# Patient Record
Sex: FEMALE | Race: WHITE | Employment: UNEMPLOYED | URBAN - METROPOLITAN AREA
[De-identification: names, ages, dates, MRNs, and addresses within clinical notes are randomized per-mention and may not be internally consistent; named-entity substitution may affect disease eponyms.]

---

## 2017-03-27 ENCOUNTER — OFFICE VISIT (OUTPATIENT)
Dept: INTERNAL MEDICINE CLINIC | Age: 82
End: 2017-03-27

## 2017-03-27 VITALS
DIASTOLIC BLOOD PRESSURE: 78 MMHG | HEART RATE: 88 BPM | HEIGHT: 63 IN | BODY MASS INDEX: 30.65 KG/M2 | SYSTOLIC BLOOD PRESSURE: 118 MMHG | WEIGHT: 173 LBS

## 2017-03-27 DIAGNOSIS — G25.81 RESTLESS LEGS SYNDROME: ICD-10-CM

## 2017-03-27 DIAGNOSIS — J84.112 UIP (USUAL INTERSTITIAL PNEUMONITIS) (HCC): ICD-10-CM

## 2017-03-27 DIAGNOSIS — K21.9 GASTROESOPHAGEAL REFLUX DISEASE WITHOUT ESOPHAGITIS: Primary | ICD-10-CM

## 2017-03-27 DIAGNOSIS — J96.11 CHRONIC RESPIRATORY FAILURE WITH HYPOXIA (HCC): ICD-10-CM

## 2017-03-27 PROCEDURE — G8399 PT W/DXA RESULTS DOCUMENT: HCPCS | Performed by: FAMILY MEDICINE

## 2017-03-27 PROCEDURE — G8417 CALC BMI ABV UP PARAM F/U: HCPCS | Performed by: FAMILY MEDICINE

## 2017-03-27 PROCEDURE — G8484 FLU IMMUNIZE NO ADMIN: HCPCS | Performed by: FAMILY MEDICINE

## 2017-03-27 PROCEDURE — 99213 OFFICE O/P EST LOW 20 MIN: CPT | Performed by: FAMILY MEDICINE

## 2017-03-27 PROCEDURE — 1123F ACP DISCUSS/DSCN MKR DOCD: CPT | Performed by: FAMILY MEDICINE

## 2017-03-27 PROCEDURE — 4040F PNEUMOC VAC/ADMIN/RCVD: CPT | Performed by: FAMILY MEDICINE

## 2017-03-27 PROCEDURE — 1090F PRES/ABSN URINE INCON ASSESS: CPT | Performed by: FAMILY MEDICINE

## 2017-03-27 PROCEDURE — 1036F TOBACCO NON-USER: CPT | Performed by: FAMILY MEDICINE

## 2017-03-27 PROCEDURE — G8427 DOCREV CUR MEDS BY ELIG CLIN: HCPCS | Performed by: FAMILY MEDICINE

## 2017-03-27 ASSESSMENT — ENCOUNTER SYMPTOMS
CONSTIPATION: 0
CHEST TIGHTNESS: 0
WHEEZING: 0
NAUSEA: 0
VOMITING: 0
SHORTNESS OF BREATH: 0
COUGH: 1
DIARRHEA: 0

## 2017-08-12 DIAGNOSIS — K21.9 GASTROESOPHAGEAL REFLUX DISEASE WITHOUT ESOPHAGITIS: ICD-10-CM

## 2017-08-14 RX ORDER — OMEPRAZOLE 20 MG/1
CAPSULE, DELAYED RELEASE ORAL
Qty: 30 CAPSULE | Refills: 9 | Status: SHIPPED | OUTPATIENT
Start: 2017-08-14 | End: 2018-07-10 | Stop reason: SDUPTHER

## 2017-09-25 ENCOUNTER — OFFICE VISIT (OUTPATIENT)
Dept: INTERNAL MEDICINE CLINIC | Age: 82
End: 2017-09-25

## 2017-09-25 VITALS
WEIGHT: 166 LBS | HEART RATE: 80 BPM | BODY MASS INDEX: 29.41 KG/M2 | DIASTOLIC BLOOD PRESSURE: 68 MMHG | HEIGHT: 63 IN | SYSTOLIC BLOOD PRESSURE: 110 MMHG

## 2017-09-25 DIAGNOSIS — M15.9 PRIMARY OSTEOARTHRITIS INVOLVING MULTIPLE JOINTS: ICD-10-CM

## 2017-09-25 DIAGNOSIS — K59.00 CONSTIPATION, UNSPECIFIED CONSTIPATION TYPE: ICD-10-CM

## 2017-09-25 DIAGNOSIS — Z23 NEED FOR INFLUENZA VACCINATION: ICD-10-CM

## 2017-09-25 DIAGNOSIS — N39.46 URINARY INCONTINENCE, MIXED: ICD-10-CM

## 2017-09-25 DIAGNOSIS — K21.9 GASTROESOPHAGEAL REFLUX DISEASE WITHOUT ESOPHAGITIS: Primary | ICD-10-CM

## 2017-09-25 DIAGNOSIS — Z79.899 HIGH RISK MEDICATION USE: ICD-10-CM

## 2017-09-25 DIAGNOSIS — G25.81 RESTLESS LEGS SYNDROME: ICD-10-CM

## 2017-09-25 LAB
A/G RATIO: 1 (ref 1.1–2.2)
ALBUMIN SERPL-MCNC: 3.5 G/DL (ref 3.4–5)
ALP BLD-CCNC: 86 U/L (ref 40–129)
ALT SERPL-CCNC: 10 U/L (ref 10–40)
ANION GAP SERPL CALCULATED.3IONS-SCNC: 12 MMOL/L (ref 3–16)
AST SERPL-CCNC: 18 U/L (ref 15–37)
BASOPHILS ABSOLUTE: 0 K/UL (ref 0–0.2)
BASOPHILS RELATIVE PERCENT: 0.6 %
BILIRUB SERPL-MCNC: 0.3 MG/DL (ref 0–1)
BUN BLDV-MCNC: 16 MG/DL (ref 7–20)
CALCIUM SERPL-MCNC: 9.4 MG/DL (ref 8.3–10.6)
CHLORIDE BLD-SCNC: 101 MMOL/L (ref 99–110)
CO2: 29 MMOL/L (ref 21–32)
CREAT SERPL-MCNC: 0.7 MG/DL (ref 0.6–1.2)
EOSINOPHILS ABSOLUTE: 0.3 K/UL (ref 0–0.6)
EOSINOPHILS RELATIVE PERCENT: 4.2 %
GFR AFRICAN AMERICAN: >60
GFR NON-AFRICAN AMERICAN: >60
GLOBULIN: 3.4 G/DL
GLUCOSE BLD-MCNC: 96 MG/DL (ref 70–99)
HCT VFR BLD CALC: 37.3 % (ref 36–48)
HEMOGLOBIN: 12.1 G/DL (ref 12–16)
LYMPHOCYTES ABSOLUTE: 2.2 K/UL (ref 1–5.1)
LYMPHOCYTES RELATIVE PERCENT: 33.6 %
MAGNESIUM: 2.4 MG/DL (ref 1.8–2.4)
MCH RBC QN AUTO: 32.5 PG (ref 26–34)
MCHC RBC AUTO-ENTMCNC: 32.6 G/DL (ref 31–36)
MCV RBC AUTO: 99.7 FL (ref 80–100)
MONOCYTES ABSOLUTE: 0.5 K/UL (ref 0–1.3)
MONOCYTES RELATIVE PERCENT: 7 %
NEUTROPHILS ABSOLUTE: 3.6 K/UL (ref 1.7–7.7)
NEUTROPHILS RELATIVE PERCENT: 54.6 %
PDW BLD-RTO: 14.6 % (ref 12.4–15.4)
PLATELET # BLD: 168 K/UL (ref 135–450)
PMV BLD AUTO: 10.3 FL (ref 5–10.5)
POTASSIUM SERPL-SCNC: 4.6 MMOL/L (ref 3.5–5.1)
RBC # BLD: 3.74 M/UL (ref 4–5.2)
SODIUM BLD-SCNC: 142 MMOL/L (ref 136–145)
TOTAL PROTEIN: 6.9 G/DL (ref 6.4–8.2)
VITAMIN B-12: 1285 PG/ML (ref 211–911)
WBC # BLD: 6.6 K/UL (ref 4–11)

## 2017-09-25 PROCEDURE — 0509F URINE INCON PLAN DOCD: CPT | Performed by: FAMILY MEDICINE

## 2017-09-25 PROCEDURE — G8427 DOCREV CUR MEDS BY ELIG CLIN: HCPCS | Performed by: FAMILY MEDICINE

## 2017-09-25 PROCEDURE — 1123F ACP DISCUSS/DSCN MKR DOCD: CPT | Performed by: FAMILY MEDICINE

## 2017-09-25 PROCEDURE — G8399 PT W/DXA RESULTS DOCUMENT: HCPCS | Performed by: FAMILY MEDICINE

## 2017-09-25 PROCEDURE — G8417 CALC BMI ABV UP PARAM F/U: HCPCS | Performed by: FAMILY MEDICINE

## 2017-09-25 PROCEDURE — G0008 ADMIN INFLUENZA VIRUS VAC: HCPCS | Performed by: FAMILY MEDICINE

## 2017-09-25 PROCEDURE — 4040F PNEUMOC VAC/ADMIN/RCVD: CPT | Performed by: FAMILY MEDICINE

## 2017-09-25 PROCEDURE — 99214 OFFICE O/P EST MOD 30 MIN: CPT | Performed by: FAMILY MEDICINE

## 2017-09-25 PROCEDURE — 1090F PRES/ABSN URINE INCON ASSESS: CPT | Performed by: FAMILY MEDICINE

## 2017-09-25 PROCEDURE — 1036F TOBACCO NON-USER: CPT | Performed by: FAMILY MEDICINE

## 2017-09-25 PROCEDURE — 90662 IIV NO PRSV INCREASED AG IM: CPT | Performed by: FAMILY MEDICINE

## 2017-09-25 RX ORDER — ROPINIROLE 1 MG/1
TABLET, FILM COATED ORAL
Qty: 30 TABLET | Refills: 11 | Status: SHIPPED | OUTPATIENT
Start: 2017-09-25 | End: 2017-11-27 | Stop reason: SDUPTHER

## 2017-09-25 RX ORDER — MAGNESIUM HYDROXIDE 1200 MG/15ML
SUSPENSION ORAL
COMMUNITY
Start: 2017-09-25 | End: 2018-09-26 | Stop reason: ALTCHOICE

## 2017-09-25 RX ORDER — OXYBUTYNIN CHLORIDE 10 MG/1
TABLET, EXTENDED RELEASE ORAL
Qty: 30 TABLET | Refills: 11 | Status: SHIPPED | OUTPATIENT
Start: 2017-09-25 | End: 2018-09-26 | Stop reason: SDUPTHER

## 2017-09-25 ASSESSMENT — ENCOUNTER SYMPTOMS
COUGH: 0
ABDOMINAL PAIN: 0
CONSTIPATION: 1
ABDOMINAL DISTENTION: 0
SHORTNESS OF BREATH: 0
WHEEZING: 0
CHEST TIGHTNESS: 0
DIARRHEA: 0
BLOOD IN STOOL: 0

## 2017-09-25 ASSESSMENT — PATIENT HEALTH QUESTIONNAIRE - PHQ9
SUM OF ALL RESPONSES TO PHQ9 QUESTIONS 1 & 2: 0
2. FEELING DOWN, DEPRESSED OR HOPELESS: 0
SUM OF ALL RESPONSES TO PHQ QUESTIONS 1-9: 0
1. LITTLE INTEREST OR PLEASURE IN DOING THINGS: 0

## 2017-09-27 ENCOUNTER — OFFICE VISIT (OUTPATIENT)
Dept: PULMONOLOGY | Age: 82
End: 2017-09-27

## 2017-09-27 VITALS
HEART RATE: 66 BPM | DIASTOLIC BLOOD PRESSURE: 72 MMHG | WEIGHT: 167 LBS | BODY MASS INDEX: 29.58 KG/M2 | SYSTOLIC BLOOD PRESSURE: 132 MMHG

## 2017-09-27 DIAGNOSIS — K21.9 GASTROESOPHAGEAL REFLUX DISEASE WITHOUT ESOPHAGITIS: ICD-10-CM

## 2017-09-27 DIAGNOSIS — J96.11 CHRONIC RESPIRATORY FAILURE WITH HYPOXIA (HCC): ICD-10-CM

## 2017-09-27 DIAGNOSIS — J84.112 UIP (USUAL INTERSTITIAL PNEUMONITIS) (HCC): ICD-10-CM

## 2017-09-27 DIAGNOSIS — R05.9 COUGH: Primary | ICD-10-CM

## 2017-09-27 PROCEDURE — G8427 DOCREV CUR MEDS BY ELIG CLIN: HCPCS | Performed by: INTERNAL MEDICINE

## 2017-09-27 PROCEDURE — 4040F PNEUMOC VAC/ADMIN/RCVD: CPT | Performed by: INTERNAL MEDICINE

## 2017-09-27 PROCEDURE — G8417 CALC BMI ABV UP PARAM F/U: HCPCS | Performed by: INTERNAL MEDICINE

## 2017-09-27 PROCEDURE — 1090F PRES/ABSN URINE INCON ASSESS: CPT | Performed by: INTERNAL MEDICINE

## 2017-09-27 PROCEDURE — 99214 OFFICE O/P EST MOD 30 MIN: CPT | Performed by: INTERNAL MEDICINE

## 2017-09-27 PROCEDURE — 1036F TOBACCO NON-USER: CPT | Performed by: INTERNAL MEDICINE

## 2017-09-27 PROCEDURE — G8399 PT W/DXA RESULTS DOCUMENT: HCPCS | Performed by: INTERNAL MEDICINE

## 2017-09-27 PROCEDURE — 1123F ACP DISCUSS/DSCN MKR DOCD: CPT | Performed by: INTERNAL MEDICINE

## 2017-10-11 ENCOUNTER — HOSPITAL ENCOUNTER (OUTPATIENT)
Dept: PULMONOLOGY | Age: 82
Discharge: HOME | End: 2017-10-11
Attending: INTERNAL MEDICINE | Admitting: INTERNAL MEDICINE

## 2017-10-11 VITALS — HEART RATE: 86 BPM | RESPIRATION RATE: 18 BRPM | OXYGEN SATURATION: 95 %

## 2017-10-11 DIAGNOSIS — J84.112 UIP (USUAL INTERSTITIAL PNEUMONITIS) (HCC): ICD-10-CM

## 2017-10-11 DIAGNOSIS — J84.112 IDIOPATHIC PULMONARY FIBROSIS (HCC): ICD-10-CM

## 2017-10-11 RX ORDER — ALBUTEROL SULFATE 90 UG/1
4 AEROSOL, METERED RESPIRATORY (INHALATION) ONCE
Status: DISCONTINUED | OUTPATIENT
Start: 2017-10-11 | End: 2017-10-11

## 2017-10-12 NOTE — PROCEDURES
[facility]                    [facility], [address]                              PULMONARY FUNCTION    PATIENT NAME: Carson Alfonso                :             1935  MED REC NO:   [id number]                           ROOM:  ACCOUNT NO:   [de-identified]                           ADMISSION DATE:  10/11/2017  PROVIDER:     Mervat Hutchinson MD    DATE OF PROCEDURE:  10/11/2017    INDICATION:  Pulmonary fibrosis. INTERPRETATION:  Spirometry attempts were acceptable and reproducible. FVC was normal at 1.82 liters, 81% predicted and normal FEV1 of 1.61  liters, 97% predicted. FEV1/FVC ratio was normal.  Lung volumes  showed decreased total lung capacity of 73% predicted. Diffusion  capacity showed decreased DLCO of 39% predicted. IMPRESSION:  Mild restrictive pattern seen on this pulmonary function  test with severe decrease in diffusion capacity. In comparison to the  test that was done in 2016, FVC has decreased by 9% in total lung  capacity by 12% and DLCO by 13%.         Gagan Garcia MD    D: 10/12/2017 10:14:45       T: 10/12/2017 10:15:36     /[email or web address]  Job#: [id number]     Doc#: [id number]

## 2017-10-18 ENCOUNTER — TELEPHONE (OUTPATIENT)
Dept: PULMONOLOGY | Age: 82
End: 2017-10-18

## 2017-11-27 ENCOUNTER — TELEPHONE (OUTPATIENT)
Dept: INTERNAL MEDICINE CLINIC | Age: 82
End: 2017-11-27

## 2017-11-27 DIAGNOSIS — G25.81 RESTLESS LEGS SYNDROME: ICD-10-CM

## 2017-11-27 RX ORDER — ROPINIROLE 1 MG/1
TABLET, FILM COATED ORAL
Qty: 60 TABLET | Refills: 5 | Status: SHIPPED | OUTPATIENT
Start: 2017-11-27 | End: 2018-05-28 | Stop reason: SDUPTHER

## 2017-11-27 NOTE — TELEPHONE ENCOUNTER
Pt calling, states  She takes Requip for her legs at night. She is asking if dosage can be changed or if she can take this twice nightly? Pt states one a night is not helping and sometimes she does take an extra pill.

## 2018-03-26 ENCOUNTER — OFFICE VISIT (OUTPATIENT)
Dept: INTERNAL MEDICINE CLINIC | Age: 83
End: 2018-03-26

## 2018-03-26 VITALS
DIASTOLIC BLOOD PRESSURE: 76 MMHG | WEIGHT: 164 LBS | HEIGHT: 63 IN | HEART RATE: 84 BPM | BODY MASS INDEX: 29.06 KG/M2 | SYSTOLIC BLOOD PRESSURE: 124 MMHG

## 2018-03-26 DIAGNOSIS — M15.9 PRIMARY OSTEOARTHRITIS INVOLVING MULTIPLE JOINTS: ICD-10-CM

## 2018-03-26 DIAGNOSIS — K21.9 GASTROESOPHAGEAL REFLUX DISEASE WITHOUT ESOPHAGITIS: Primary | ICD-10-CM

## 2018-03-26 DIAGNOSIS — J96.11 CHRONIC RESPIRATORY FAILURE WITH HYPOXIA (HCC): ICD-10-CM

## 2018-03-26 DIAGNOSIS — G25.81 RESTLESS LEGS SYNDROME: ICD-10-CM

## 2018-03-26 DIAGNOSIS — J84.112 UIP (USUAL INTERSTITIAL PNEUMONITIS) (HCC): ICD-10-CM

## 2018-03-26 DIAGNOSIS — N39.46 URINARY INCONTINENCE, MIXED: ICD-10-CM

## 2018-03-26 PROCEDURE — 1036F TOBACCO NON-USER: CPT | Performed by: FAMILY MEDICINE

## 2018-03-26 PROCEDURE — 4040F PNEUMOC VAC/ADMIN/RCVD: CPT | Performed by: FAMILY MEDICINE

## 2018-03-26 PROCEDURE — 99214 OFFICE O/P EST MOD 30 MIN: CPT | Performed by: FAMILY MEDICINE

## 2018-03-26 PROCEDURE — 1090F PRES/ABSN URINE INCON ASSESS: CPT | Performed by: FAMILY MEDICINE

## 2018-03-26 PROCEDURE — G8427 DOCREV CUR MEDS BY ELIG CLIN: HCPCS | Performed by: FAMILY MEDICINE

## 2018-03-26 PROCEDURE — 0509F URINE INCON PLAN DOCD: CPT | Performed by: FAMILY MEDICINE

## 2018-03-26 PROCEDURE — G8482 FLU IMMUNIZE ORDER/ADMIN: HCPCS | Performed by: FAMILY MEDICINE

## 2018-03-26 PROCEDURE — 1123F ACP DISCUSS/DSCN MKR DOCD: CPT | Performed by: FAMILY MEDICINE

## 2018-03-26 PROCEDURE — G8399 PT W/DXA RESULTS DOCUMENT: HCPCS | Performed by: FAMILY MEDICINE

## 2018-03-26 PROCEDURE — G8417 CALC BMI ABV UP PARAM F/U: HCPCS | Performed by: FAMILY MEDICINE

## 2018-03-26 ASSESSMENT — ENCOUNTER SYMPTOMS
COUGH: 0
NAUSEA: 0
DIARRHEA: 0
WHEEZING: 0
VOMITING: 0
ABDOMINAL PAIN: 0

## 2018-03-26 NOTE — PROGRESS NOTES
pain, palpitations and leg swelling. Gastrointestinal: Negative for abdominal pain, diarrhea, nausea and vomiting. Genitourinary: Negative for difficulty urinating, flank pain, hematuria and vaginal discharge. Objective:   Physical Exam   Constitutional: She is oriented to person, place, and time. She appears well-developed and well-nourished. No distress. Eyes: Conjunctivae are normal. No scleral icterus. Neck: Normal range of motion. Neck supple. Carotid bruit is not present. No thyroid mass and no thyromegaly present. Cardiovascular: Normal rate, regular rhythm, S1 normal, S2 normal, normal heart sounds and intact distal pulses. No murmur heard. Pulmonary/Chest: Effort normal. No respiratory distress. She has no decreased breath sounds. She has no wheezes. She has no rhonchi. She has rales in the right lower field and the left lower field. Abdominal: Soft. Normal appearance and bowel sounds are normal. She exhibits no abdominal bruit and no mass. There is no hepatomegaly. There is no tenderness. Lymphadenopathy:     She has no cervical adenopathy. Neurological: She is alert and oriented to person, place, and time. She displays no tremor. She exhibits normal muscle tone. Coordination and gait normal.   Skin: Skin is warm and dry. She is not diaphoretic. No cyanosis. No pallor. Nails show no clubbing. Psychiatric: She has a normal mood and affect. Her speech is normal and behavior is normal. Cognition and memory are normal.   Vitals reviewed. Wt Readings from Last 3 Encounters:   03/26/18 164 lb (74.4 kg)   09/27/17 167 lb (75.8 kg)   09/25/17 166 lb (75.3 kg)     Temp Readings from Last 3 Encounters:   09/03/13 97.9 °F (36.6 °C) (Oral)     BP Readings from Last 3 Encounters:   03/26/18 124/76   09/27/17 132/72   09/25/17 110/68     Pulse Readings from Last 3 Encounters:   03/26/18 84   10/11/17 86   09/27/17 66         Assessment:      1.  Gastroesophageal reflux disease without

## 2018-05-02 ENCOUNTER — OFFICE VISIT (OUTPATIENT)
Dept: INTERNAL MEDICINE CLINIC | Age: 83
End: 2018-05-02

## 2018-05-02 VITALS
SYSTOLIC BLOOD PRESSURE: 126 MMHG | DIASTOLIC BLOOD PRESSURE: 84 MMHG | BODY MASS INDEX: 25.52 KG/M2 | WEIGHT: 144 LBS | HEART RATE: 88 BPM | HEIGHT: 63 IN

## 2018-05-02 DIAGNOSIS — C18.2 MALIGNANT NEOPLASM OF ASCENDING COLON (HCC): ICD-10-CM

## 2018-05-02 DIAGNOSIS — D62 ACUTE BLOOD LOSS ANEMIA: ICD-10-CM

## 2018-05-02 DIAGNOSIS — Z09 HOSPITAL DISCHARGE FOLLOW-UP: Primary | ICD-10-CM

## 2018-05-02 DIAGNOSIS — K85.10 ACUTE BILIARY PANCREATITIS WITHOUT INFECTION OR NECROSIS: ICD-10-CM

## 2018-05-02 PROBLEM — K85.90 ACUTE PANCREATITIS WITHOUT INFECTION OR NECROSIS: Status: ACTIVE | Noted: 2018-04-11

## 2018-05-02 PROBLEM — C18.9 COLON CANCER (HCC): Status: ACTIVE | Noted: 2018-04-01

## 2018-05-02 PROCEDURE — 1090F PRES/ABSN URINE INCON ASSESS: CPT | Performed by: FAMILY MEDICINE

## 2018-05-02 PROCEDURE — 4040F PNEUMOC VAC/ADMIN/RCVD: CPT | Performed by: FAMILY MEDICINE

## 2018-05-02 PROCEDURE — 1111F DSCHRG MED/CURRENT MED MERGE: CPT | Performed by: FAMILY MEDICINE

## 2018-05-02 PROCEDURE — 99214 OFFICE O/P EST MOD 30 MIN: CPT | Performed by: FAMILY MEDICINE

## 2018-05-02 PROCEDURE — G8417 CALC BMI ABV UP PARAM F/U: HCPCS | Performed by: FAMILY MEDICINE

## 2018-05-02 PROCEDURE — G8399 PT W/DXA RESULTS DOCUMENT: HCPCS | Performed by: FAMILY MEDICINE

## 2018-05-02 PROCEDURE — G8427 DOCREV CUR MEDS BY ELIG CLIN: HCPCS | Performed by: FAMILY MEDICINE

## 2018-05-02 PROCEDURE — 1036F TOBACCO NON-USER: CPT | Performed by: FAMILY MEDICINE

## 2018-05-02 PROCEDURE — 1123F ACP DISCUSS/DSCN MKR DOCD: CPT | Performed by: FAMILY MEDICINE

## 2018-05-02 ASSESSMENT — ENCOUNTER SYMPTOMS
ABDOMINAL PAIN: 1
DIARRHEA: 0
CONSTIPATION: 0
SHORTNESS OF BREATH: 0
COUGH: 0
NAUSEA: 0
WHEEZING: 0
CHEST TIGHTNESS: 0
VOMITING: 0

## 2018-05-10 ENCOUNTER — TELEPHONE (OUTPATIENT)
Dept: INTERNAL MEDICINE CLINIC | Age: 83
End: 2018-05-10

## 2018-05-10 DIAGNOSIS — K59.00 CONSTIPATION, UNSPECIFIED CONSTIPATION TYPE: Primary | ICD-10-CM

## 2018-05-11 ENCOUNTER — TELEPHONE (OUTPATIENT)
Dept: INTERNAL MEDICINE CLINIC | Age: 83
End: 2018-05-11

## 2018-05-11 RX ORDER — POLYETHYLENE GLYCOL 3350 17 G/17G
POWDER, FOR SOLUTION ORAL
Qty: 1 BOTTLE | Refills: 12 | Status: SHIPPED | OUTPATIENT
Start: 2018-05-11 | End: 2018-06-10

## 2018-05-28 DIAGNOSIS — G25.81 RESTLESS LEGS SYNDROME: ICD-10-CM

## 2018-05-29 RX ORDER — ROPINIROLE 1 MG/1
TABLET, FILM COATED ORAL
Qty: 60 TABLET | Refills: 4 | Status: SHIPPED | OUTPATIENT
Start: 2018-05-29 | End: 2018-09-26 | Stop reason: SDUPTHER

## 2018-06-08 ENCOUNTER — OFFICE VISIT (OUTPATIENT)
Dept: INTERNAL MEDICINE CLINIC | Age: 83
End: 2018-06-08

## 2018-06-08 VITALS
SYSTOLIC BLOOD PRESSURE: 120 MMHG | HEIGHT: 63 IN | WEIGHT: 158 LBS | DIASTOLIC BLOOD PRESSURE: 70 MMHG | HEART RATE: 92 BPM | BODY MASS INDEX: 28 KG/M2

## 2018-06-08 DIAGNOSIS — G25.81 RESTLESS LEGS SYNDROME: ICD-10-CM

## 2018-06-08 DIAGNOSIS — D62 ACUTE BLOOD LOSS ANEMIA: Primary | ICD-10-CM

## 2018-06-08 LAB
BASOPHILS ABSOLUTE: 0 K/UL (ref 0–0.2)
BASOPHILS RELATIVE PERCENT: 0.6 %
EOSINOPHILS ABSOLUTE: 0.1 K/UL (ref 0–0.6)
EOSINOPHILS RELATIVE PERCENT: 1.8 %
FERRITIN: 133 NG/ML (ref 15–150)
HCT VFR BLD CALC: 35.9 % (ref 36–48)
HEMOGLOBIN: 11.7 G/DL (ref 12–16)
IRON SATURATION: 26 % (ref 15–50)
IRON: 73 UG/DL (ref 37–145)
LYMPHOCYTES ABSOLUTE: 2.8 K/UL (ref 1–5.1)
LYMPHOCYTES RELATIVE PERCENT: 47.4 %
MCH RBC QN AUTO: 32.2 PG (ref 26–34)
MCHC RBC AUTO-ENTMCNC: 32.6 G/DL (ref 31–36)
MCV RBC AUTO: 98.8 FL (ref 80–100)
MONOCYTES ABSOLUTE: 0.4 K/UL (ref 0–1.3)
MONOCYTES RELATIVE PERCENT: 7.4 %
NEUTROPHILS ABSOLUTE: 2.6 K/UL (ref 1.7–7.7)
NEUTROPHILS RELATIVE PERCENT: 42.8 %
PDW BLD-RTO: 16.5 % (ref 12.4–15.4)
PLATELET # BLD: 199 K/UL (ref 135–450)
PMV BLD AUTO: 9.9 FL (ref 5–10.5)
RBC # BLD: 3.63 M/UL (ref 4–5.2)
TOTAL IRON BINDING CAPACITY: 286 UG/DL (ref 260–445)
WBC # BLD: 6 K/UL (ref 4–11)

## 2018-06-08 PROCEDURE — 4040F PNEUMOC VAC/ADMIN/RCVD: CPT | Performed by: FAMILY MEDICINE

## 2018-06-08 PROCEDURE — G8399 PT W/DXA RESULTS DOCUMENT: HCPCS | Performed by: FAMILY MEDICINE

## 2018-06-08 PROCEDURE — 99213 OFFICE O/P EST LOW 20 MIN: CPT | Performed by: FAMILY MEDICINE

## 2018-06-08 PROCEDURE — G8427 DOCREV CUR MEDS BY ELIG CLIN: HCPCS | Performed by: FAMILY MEDICINE

## 2018-06-08 PROCEDURE — 1090F PRES/ABSN URINE INCON ASSESS: CPT | Performed by: FAMILY MEDICINE

## 2018-06-08 PROCEDURE — 1123F ACP DISCUSS/DSCN MKR DOCD: CPT | Performed by: FAMILY MEDICINE

## 2018-06-08 PROCEDURE — 1036F TOBACCO NON-USER: CPT | Performed by: FAMILY MEDICINE

## 2018-06-08 PROCEDURE — G8417 CALC BMI ABV UP PARAM F/U: HCPCS | Performed by: FAMILY MEDICINE

## 2018-06-08 RX ORDER — GABAPENTIN 100 MG/1
CAPSULE ORAL
Qty: 90 CAPSULE | Refills: 2 | Status: SHIPPED | OUTPATIENT
Start: 2018-06-08 | End: 2018-08-31 | Stop reason: SDUPTHER

## 2018-06-08 RX ORDER — FERROUS SULFATE 325(65) MG
325 TABLET ORAL
Status: ON HOLD | COMMUNITY
End: 2019-04-18

## 2018-06-08 ASSESSMENT — ENCOUNTER SYMPTOMS: COLOR CHANGE: 0

## 2018-07-10 DIAGNOSIS — K21.9 GASTROESOPHAGEAL REFLUX DISEASE WITHOUT ESOPHAGITIS: ICD-10-CM

## 2018-07-10 RX ORDER — OMEPRAZOLE 20 MG/1
CAPSULE, DELAYED RELEASE ORAL
Qty: 30 CAPSULE | Refills: 8 | Status: SHIPPED | OUTPATIENT
Start: 2018-07-10 | End: 2019-03-27 | Stop reason: SDUPTHER

## 2018-07-20 ENCOUNTER — TELEPHONE (OUTPATIENT)
Dept: INTERNAL MEDICINE CLINIC | Age: 83
End: 2018-07-20

## 2018-07-20 NOTE — TELEPHONE ENCOUNTER
Patient is calling to complain of diarrhea. States it stared at the beginning of the week and has gotten worse. Today she has already had 4 episodes of watery stools. Said she was taking Miralax but hasn't taken it since her last surgery. Please call and advise what she could take to help this issue.      Pharmacy is [phone] [facility]

## 2018-08-31 DIAGNOSIS — G25.81 RESTLESS LEGS SYNDROME: ICD-10-CM

## 2018-08-31 RX ORDER — GABAPENTIN 100 MG/1
CAPSULE ORAL
Qty: 90 CAPSULE | Refills: 2 | Status: SHIPPED | OUTPATIENT
Start: 2018-08-31 | End: 2018-09-20 | Stop reason: SDUPTHER

## 2018-09-20 ENCOUNTER — TELEPHONE (OUTPATIENT)
Dept: INTERNAL MEDICINE CLINIC | Age: 83
End: 2018-09-20

## 2018-09-20 DIAGNOSIS — G25.81 RESTLESS LEGS SYNDROME: ICD-10-CM

## 2018-09-20 RX ORDER — GABAPENTIN 100 MG/1
200 CAPSULE ORAL 2 TIMES DAILY
Qty: 120 CAPSULE | Refills: 2 | Status: SHIPPED | OUTPATIENT
Start: 2018-09-20 | End: 2018-12-19 | Stop reason: SDUPTHER

## 2018-09-20 NOTE — TELEPHONE ENCOUNTER
Susan with [employer], Pt came in for a refill on her Gabapentin. However based on Pharmacy's calculations she should still roughly have about 45 pills left since last fill and she is requesting the refill 15 days early. They checked OARRS and that turned out ok. Pt only has 4 pills left in her bottle.

## 2018-09-25 ENCOUNTER — OFFICE VISIT (OUTPATIENT)
Dept: PULMONOLOGY | Age: 83
End: 2018-09-25
Payer: MEDICARE

## 2018-09-25 VITALS
BODY MASS INDEX: 28.7 KG/M2 | HEART RATE: 114 BPM | WEIGHT: 162 LBS | DIASTOLIC BLOOD PRESSURE: 70 MMHG | OXYGEN SATURATION: 96 % | SYSTOLIC BLOOD PRESSURE: 133 MMHG

## 2018-09-25 DIAGNOSIS — K21.9 GASTROESOPHAGEAL REFLUX DISEASE WITHOUT ESOPHAGITIS: ICD-10-CM

## 2018-09-25 DIAGNOSIS — J96.11 CHRONIC RESPIRATORY FAILURE WITH HYPOXIA (HCC): ICD-10-CM

## 2018-09-25 DIAGNOSIS — J84.112 UIP (USUAL INTERSTITIAL PNEUMONITIS) (HCC): Primary | ICD-10-CM

## 2018-09-25 DIAGNOSIS — R05.3 CHRONIC COUGH: ICD-10-CM

## 2018-09-25 PROCEDURE — 1123F ACP DISCUSS/DSCN MKR DOCD: CPT | Performed by: INTERNAL MEDICINE

## 2018-09-25 PROCEDURE — G8399 PT W/DXA RESULTS DOCUMENT: HCPCS | Performed by: INTERNAL MEDICINE

## 2018-09-25 PROCEDURE — 99214 OFFICE O/P EST MOD 30 MIN: CPT | Performed by: INTERNAL MEDICINE

## 2018-09-25 PROCEDURE — G8417 CALC BMI ABV UP PARAM F/U: HCPCS | Performed by: INTERNAL MEDICINE

## 2018-09-25 PROCEDURE — 1101F PT FALLS ASSESS-DOCD LE1/YR: CPT | Performed by: INTERNAL MEDICINE

## 2018-09-25 PROCEDURE — G8427 DOCREV CUR MEDS BY ELIG CLIN: HCPCS | Performed by: INTERNAL MEDICINE

## 2018-09-25 PROCEDURE — 1090F PRES/ABSN URINE INCON ASSESS: CPT | Performed by: INTERNAL MEDICINE

## 2018-09-25 PROCEDURE — 1036F TOBACCO NON-USER: CPT | Performed by: INTERNAL MEDICINE

## 2018-09-25 PROCEDURE — 4040F PNEUMOC VAC/ADMIN/RCVD: CPT | Performed by: INTERNAL MEDICINE

## 2018-09-25 NOTE — PROGRESS NOTES
[facility]  Progress Note  Henrik Warner MD       Darlene Maynard   YOB: 1935    Date of Visit:  9/25/2018    Assessment/Plan:  1. UIP (usual interstitial pneumonitis) ([address]  · I have independently reviewed radiographic images for this patient as part of this visit. · I have independently reviewed pulmonary function testing. · CT imaging does reveal continued evidence of UIP with honeycomb fibrosis, traction bronchiectasis and heterogeneous distribution of fibrosis. 10/17 imaging was stable  · Repeat CT    2. Chronic respiratory failure with hypoxia (HCC)  O2 sats are acceptable on supplemental O2. · The patient benefits from the use of supplemental O2.  · Uses this with sleep    3. Gastroesophageal reflux disease without esophagitis  · Continue Prilosec  · Concern that ongoing GERD could contribute to worsening lung disease  · Symptomatically, she is pretty well controlled    4. Cough  · This is her major symptom  · This is a stable problem without significant change in the last 12 months      FOLLOW UP: One year. Chief Complaint   Patient presents with    1 Year Follow Up     still wearing her O2 at night       HPI  The patient presentwith a chief complaint of shortness of breath, cough due to UIP. She has noticed worsening dyspnea. She is SOB especially if she walks up a grade. She is sometimes SOB with dressing. She occasional cough and sputum which is described as clear. At the present time, she is not taking any medication for her UIP. There is no complaint of chest pain, nausea or vomiting. Review of Systems  As documented in HPI     Physical Exam:  Well developed, well nourished  Alert and oriented  Sclera is clear  No cervical adenopathy  No JVD. Chest examination is basilar inspiratory crackles. .  Cardiac examination reveals regular rate and rhythm without murmur, gallop or rub.   The abdomen is soft, nontender and nondistended. There is no clubbing, cyanosis or edema of the extremities. There is no obvious skin rash. No focal neuro deficicts  Normal mood and affect    Allergies   Allergen Reactions    Pcn [Penicillins]      Prior to Visit Medications    Medication Sig Taking? Authorizing Provider   gabapentin (NEURONTIN) 100 MG capsule Take 2 capsules by mouth 2 times daily for 90 days. Take at supper and bedtime for restless legs. Bhumika Oviedo MD   omeprazole (PRILOSEC) 20 MG delayed release capsule TAKE ONE CAPSULE BY MOUTH DAILY  Jordin Love MD   ferrous sulfate 325 (65 Fe) MG tablet Take 325 mg by mouth daily (with breakfast)  Historical Provider, MD   rOPINIRole (REQUIP) 1 MG tablet TAKE TWO TABLETS BY MOUTH EVERY NIGHT AT BEDTIME  Jordin Love MD   MILK OF MAGNESIA 7.75 % suspension Take 1 tablespoon daily for constipation. Increase to twice a day if needed. Jordin Love MD   oxybutynin (DITROPAN-XL) 10 MG extended release tablet TAKE ONE TABLET BY MOUTH DAILY  Jordin Love MD   hydrocortisone (WESTCORT) 0.2 % cream Apply twice daily as needed to skin scale or rash. Jordin Love MD   aspirin 81 MG EC tablet Take 81 mg by mouth daily. Historical Provider, MD   multivitamin [facility]) per tablet Take 1 tablet by mouth daily. Historical Provider, MD   Diphenhydramine-APAP, sleep, (TYLENOL PM EXTRA STRENGTH PO) Take  by mouth nightly. Historical Provider, MD       Vitals:    09/25/18 1306   BP: 133/70   Pulse: 114   SpO2: 96%   Weight: 162 lb (73.5 kg)     Body mass index is 28.7 kg/m².      Wt Readings from Last 3 Encounters:   09/25/18 162 lb (73.5 kg)   06/08/18 158 lb (71.7 kg)   05/02/18 144 lb (65.3 kg)     BP Readings from Last 3 Encounters:   09/25/18 133/70   06/08/18 120/70   05/02/18 126/84        History   Smoking Status    Never Smoker   Smokeless Tobacco    Never Used

## 2018-09-26 ENCOUNTER — OFFICE VISIT (OUTPATIENT)
Dept: INTERNAL MEDICINE CLINIC | Age: 83
End: 2018-09-26
Payer: MEDICARE

## 2018-09-26 VITALS
HEART RATE: 72 BPM | BODY MASS INDEX: 28.53 KG/M2 | DIASTOLIC BLOOD PRESSURE: 72 MMHG | HEIGHT: 63 IN | SYSTOLIC BLOOD PRESSURE: 118 MMHG | WEIGHT: 161 LBS

## 2018-09-26 DIAGNOSIS — G25.81 RESTLESS LEGS SYNDROME: ICD-10-CM

## 2018-09-26 DIAGNOSIS — M15.9 PRIMARY OSTEOARTHRITIS INVOLVING MULTIPLE JOINTS: ICD-10-CM

## 2018-09-26 DIAGNOSIS — N39.46 URINARY INCONTINENCE, MIXED: ICD-10-CM

## 2018-09-26 DIAGNOSIS — R53.83 FATIGUE, UNSPECIFIED TYPE: ICD-10-CM

## 2018-09-26 DIAGNOSIS — C18.2 MALIGNANT NEOPLASM OF ASCENDING COLON (HCC): Chronic | ICD-10-CM

## 2018-09-26 DIAGNOSIS — Z23 FLU VACCINE NEED: ICD-10-CM

## 2018-09-26 DIAGNOSIS — K21.9 GASTROESOPHAGEAL REFLUX DISEASE WITHOUT ESOPHAGITIS: Primary | ICD-10-CM

## 2018-09-26 LAB
A/G RATIO: 1.1 (ref 1.1–2.2)
ALBUMIN SERPL-MCNC: 3.9 G/DL (ref 3.4–5)
ALP BLD-CCNC: 91 U/L (ref 40–129)
ALT SERPL-CCNC: 14 U/L (ref 10–40)
ANION GAP SERPL CALCULATED.3IONS-SCNC: 13 MMOL/L (ref 3–16)
AST SERPL-CCNC: 26 U/L (ref 15–37)
BASOPHILS ABSOLUTE: 0 K/UL (ref 0–0.2)
BASOPHILS RELATIVE PERCENT: 0.5 %
BILIRUB SERPL-MCNC: 0.3 MG/DL (ref 0–1)
BUN BLDV-MCNC: 20 MG/DL (ref 7–20)
CALCIUM SERPL-MCNC: 9.9 MG/DL (ref 8.3–10.6)
CHLORIDE BLD-SCNC: 100 MMOL/L (ref 99–110)
CO2: 29 MMOL/L (ref 21–32)
CREAT SERPL-MCNC: 0.8 MG/DL (ref 0.6–1.2)
EOSINOPHILS ABSOLUTE: 0.1 K/UL (ref 0–0.6)
EOSINOPHILS RELATIVE PERCENT: 1.4 %
GFR AFRICAN AMERICAN: >60
GFR NON-AFRICAN AMERICAN: >60
GLOBULIN: 3.6 G/DL
GLUCOSE BLD-MCNC: 92 MG/DL (ref 70–99)
HCT VFR BLD CALC: 36.3 % (ref 36–48)
HEMOGLOBIN: 11.8 G/DL (ref 12–16)
LYMPHOCYTES ABSOLUTE: 1.7 K/UL (ref 1–5.1)
LYMPHOCYTES RELATIVE PERCENT: 41.7 %
MAGNESIUM: 2.3 MG/DL (ref 1.8–2.4)
MCH RBC QN AUTO: 31.7 PG (ref 26–34)
MCHC RBC AUTO-ENTMCNC: 32.4 G/DL (ref 31–36)
MCV RBC AUTO: 97.7 FL (ref 80–100)
MONOCYTES ABSOLUTE: 0.4 K/UL (ref 0–1.3)
MONOCYTES RELATIVE PERCENT: 9 %
NEUTROPHILS ABSOLUTE: 2 K/UL (ref 1.7–7.7)
NEUTROPHILS RELATIVE PERCENT: 47.4 %
PDW BLD-RTO: 16.8 % (ref 12.4–15.4)
PLATELET # BLD: 170 K/UL (ref 135–450)
PMV BLD AUTO: 10.1 FL (ref 5–10.5)
POTASSIUM SERPL-SCNC: 4.6 MMOL/L (ref 3.5–5.1)
RBC # BLD: 3.72 M/UL (ref 4–5.2)
SODIUM BLD-SCNC: 142 MMOL/L (ref 136–145)
TOTAL PROTEIN: 7.5 G/DL (ref 6.4–8.2)
TSH REFLEX: 2.67 UIU/ML (ref 0.27–4.2)
VITAMIN B-12: >2000 PG/ML (ref 211–911)
WBC # BLD: 4.1 K/UL (ref 4–11)

## 2018-09-26 PROCEDURE — 1123F ACP DISCUSS/DSCN MKR DOCD: CPT | Performed by: FAMILY MEDICINE

## 2018-09-26 PROCEDURE — 90662 IIV NO PRSV INCREASED AG IM: CPT | Performed by: FAMILY MEDICINE

## 2018-09-26 PROCEDURE — G0008 ADMIN INFLUENZA VIRUS VAC: HCPCS | Performed by: FAMILY MEDICINE

## 2018-09-26 PROCEDURE — 1036F TOBACCO NON-USER: CPT | Performed by: FAMILY MEDICINE

## 2018-09-26 PROCEDURE — 99214 OFFICE O/P EST MOD 30 MIN: CPT | Performed by: FAMILY MEDICINE

## 2018-09-26 PROCEDURE — 1101F PT FALLS ASSESS-DOCD LE1/YR: CPT | Performed by: FAMILY MEDICINE

## 2018-09-26 PROCEDURE — 0509F URINE INCON PLAN DOCD: CPT | Performed by: FAMILY MEDICINE

## 2018-09-26 PROCEDURE — G8417 CALC BMI ABV UP PARAM F/U: HCPCS | Performed by: FAMILY MEDICINE

## 2018-09-26 PROCEDURE — 1090F PRES/ABSN URINE INCON ASSESS: CPT | Performed by: FAMILY MEDICINE

## 2018-09-26 PROCEDURE — G8427 DOCREV CUR MEDS BY ELIG CLIN: HCPCS | Performed by: FAMILY MEDICINE

## 2018-09-26 PROCEDURE — G8399 PT W/DXA RESULTS DOCUMENT: HCPCS | Performed by: FAMILY MEDICINE

## 2018-09-26 PROCEDURE — 4040F PNEUMOC VAC/ADMIN/RCVD: CPT | Performed by: FAMILY MEDICINE

## 2018-09-26 RX ORDER — ROPINIROLE 1 MG/1
TABLET, FILM COATED ORAL
Qty: 60 TABLET | Refills: 5 | Status: SHIPPED | OUTPATIENT
Start: 2018-09-26 | End: 2019-03-27 | Stop reason: SDUPTHER

## 2018-09-26 RX ORDER — OXYBUTYNIN CHLORIDE 10 MG/1
TABLET, EXTENDED RELEASE ORAL
Qty: 30 TABLET | Refills: 11 | Status: SHIPPED | OUTPATIENT
Start: 2018-09-26 | End: 2019-10-01 | Stop reason: SDUPTHER

## 2018-09-26 ASSESSMENT — ENCOUNTER SYMPTOMS
ABDOMINAL PAIN: 0
NAUSEA: 0
DIARRHEA: 0
COUGH: 0
VOMITING: 0
SHORTNESS OF BREATH: 1

## 2018-09-26 ASSESSMENT — PATIENT HEALTH QUESTIONNAIRE - PHQ9
SUM OF ALL RESPONSES TO PHQ QUESTIONS 1-9: 0
SUM OF ALL RESPONSES TO PHQ9 QUESTIONS 1 & 2: 0
1. LITTLE INTEREST OR PLEASURE IN DOING THINGS: 0
2. FEELING DOWN, DEPRESSED OR HOPELESS: 0
SUM OF ALL RESPONSES TO PHQ QUESTIONS 1-9: 0

## 2018-09-26 NOTE — PROGRESS NOTES
60 g 3    aspirin 81 MG EC tablet Take 81 mg by mouth daily.  multivitamin (THERAGRAN) per tablet Take 1 tablet by mouth daily.  Diphenhydramine-APAP, sleep, (TYLENOL PM EXTRA STRENGTH PO) Take  by mouth nightly. Social History   Substance Use Topics    Smoking status: Never Smoker    Smokeless tobacco: Never Used    Alcohol use No         Review of Systems   Constitutional: Negative for fever. Respiratory: Positive for shortness of breath. Negative for cough. Cardiovascular: Negative for leg swelling. Gastrointestinal: Negative for abdominal pain, diarrhea, nausea and vomiting. Genitourinary: Negative for difficulty urinating, flank pain, hematuria and vaginal discharge. Objective:   Physical Exam   Constitutional: She is oriented to person, place, and time. She appears well-developed and well-nourished. No distress. Eyes: Conjunctivae are normal. No scleral icterus. Neck: Normal range of motion. Neck supple. Carotid bruit is not present. No thyroid mass and no thyromegaly present. Cardiovascular: Normal rate, regular rhythm, S1 normal, S2 normal, intact distal pulses and normal pulses. Murmur (RUSB only) heard. Systolic murmur is present with a grade of 2/6   Pulmonary/Chest: Effort normal. No respiratory distress. She has no decreased breath sounds. She has no wheezes. She has no rhonchi. She has rales in the right lower field and the left lower field. Abdominal: Soft. Normal appearance and bowel sounds are normal. She exhibits no abdominal bruit and no mass. There is no hepatomegaly. There is no tenderness. Lymphadenopathy:     She has no cervical adenopathy. Neurological: She is alert and oriented to person, place, and time. She displays no tremor. She exhibits normal muscle tone. Coordination and gait normal.   Skin: Skin is warm and dry. She is not diaphoretic. No cyanosis. No pallor. Nails show no clubbing.    Psychiatric: She has a normal mood and

## 2018-09-26 NOTE — PATIENT INSTRUCTIONS
.SHINGLES VACCINE = SHINGRIX  1 out of 3 people will get shingles in their lifetime. If you had shingles already once in your life you have a 5% chance of getting it a second time = 5/100 chance of getting it again    The new Shingles vaccine = Shingrix is 95% effective at preventing shingles. It is a killed virus vaccine so you cannot get shingles from the vaccine or spread it to others. The old vaccine Zostavax was 50% effective and a weak live virus with more risk. The vaccine may not be covered by your insurance, but is on Medicare Part D = will be a prescription benefit and co-pays can vary widely;  out of pocket price without insurance is around $150 per dose and requires 2 doses. Show them your insurance and prescription card; The pharmacy can tell you if it is covered, and how much money you will be responsible for. It is up to you to decide if you want this vaccine.

## 2018-11-21 ENCOUNTER — HOSPITAL ENCOUNTER (OUTPATIENT)
Dept: CT IMAGING | Age: 83
Discharge: HOME | End: 2018-11-21
Payer: MEDICARE

## 2018-11-21 DIAGNOSIS — J84.112 UIP (USUAL INTERSTITIAL PNEUMONITIS) (HCC): ICD-10-CM

## 2018-11-21 DIAGNOSIS — R05.3 CHRONIC COUGH: ICD-10-CM

## 2018-11-21 PROCEDURE — 71250 CT THORAX DX C-: CPT

## 2018-12-24 ENCOUNTER — TELEPHONE (OUTPATIENT)
Dept: PULMONOLOGY | Age: 83
End: 2018-12-24

## 2019-01-07 ENCOUNTER — OFFICE VISIT (OUTPATIENT)
Dept: PULMONOLOGY | Age: 84
End: 2019-01-07
Payer: MEDICARE

## 2019-01-07 VITALS
BODY MASS INDEX: 29.94 KG/M2 | HEART RATE: 86 BPM | WEIGHT: 169 LBS | OXYGEN SATURATION: 94 % | DIASTOLIC BLOOD PRESSURE: 87 MMHG | SYSTOLIC BLOOD PRESSURE: 147 MMHG

## 2019-01-07 DIAGNOSIS — R05.3 CHRONIC COUGH: Primary | ICD-10-CM

## 2019-01-07 DIAGNOSIS — J96.11 CHRONIC RESPIRATORY FAILURE WITH HYPOXIA (HCC): ICD-10-CM

## 2019-01-07 DIAGNOSIS — J84.112 UIP (USUAL INTERSTITIAL PNEUMONITIS) (HCC): ICD-10-CM

## 2019-01-07 DIAGNOSIS — K21.9 GASTROESOPHAGEAL REFLUX DISEASE WITHOUT ESOPHAGITIS: ICD-10-CM

## 2019-01-07 PROCEDURE — 4040F PNEUMOC VAC/ADMIN/RCVD: CPT | Performed by: INTERNAL MEDICINE

## 2019-01-07 PROCEDURE — 99214 OFFICE O/P EST MOD 30 MIN: CPT | Performed by: INTERNAL MEDICINE

## 2019-01-07 PROCEDURE — G8399 PT W/DXA RESULTS DOCUMENT: HCPCS | Performed by: INTERNAL MEDICINE

## 2019-01-07 PROCEDURE — G8417 CALC BMI ABV UP PARAM F/U: HCPCS | Performed by: INTERNAL MEDICINE

## 2019-01-07 PROCEDURE — 1123F ACP DISCUSS/DSCN MKR DOCD: CPT | Performed by: INTERNAL MEDICINE

## 2019-01-07 PROCEDURE — 1090F PRES/ABSN URINE INCON ASSESS: CPT | Performed by: INTERNAL MEDICINE

## 2019-01-07 PROCEDURE — 1036F TOBACCO NON-USER: CPT | Performed by: INTERNAL MEDICINE

## 2019-01-07 PROCEDURE — 1101F PT FALLS ASSESS-DOCD LE1/YR: CPT | Performed by: INTERNAL MEDICINE

## 2019-01-07 PROCEDURE — G8427 DOCREV CUR MEDS BY ELIG CLIN: HCPCS | Performed by: INTERNAL MEDICINE

## 2019-01-07 PROCEDURE — G8482 FLU IMMUNIZE ORDER/ADMIN: HCPCS | Performed by: INTERNAL MEDICINE

## 2019-03-06 ENCOUNTER — TELEPHONE (OUTPATIENT)
Dept: PULMONOLOGY | Age: 84
End: 2019-03-06

## 2019-03-15 ENCOUNTER — TELEPHONE (OUTPATIENT)
Dept: PULMONOLOGY | Age: 84
End: 2019-03-15

## 2019-03-21 ENCOUNTER — TELEPHONE (OUTPATIENT)
Dept: PULMONOLOGY | Age: 84
End: 2019-03-21

## 2019-03-21 DIAGNOSIS — G25.81 RESTLESS LEGS SYNDROME: ICD-10-CM

## 2019-03-21 RX ORDER — GABAPENTIN 100 MG/1
CAPSULE ORAL
Qty: 120 CAPSULE | Refills: 1 | Status: SHIPPED | OUTPATIENT
Start: 2019-03-21 | End: 2019-05-20 | Stop reason: SDUPTHER

## 2019-03-27 ENCOUNTER — OFFICE VISIT (OUTPATIENT)
Dept: INTERNAL MEDICINE CLINIC | Age: 84
End: 2019-03-27
Payer: MEDICARE

## 2019-03-27 VITALS
SYSTOLIC BLOOD PRESSURE: 126 MMHG | DIASTOLIC BLOOD PRESSURE: 60 MMHG | WEIGHT: 166 LBS | BODY MASS INDEX: 29.41 KG/M2 | HEART RATE: 92 BPM

## 2019-03-27 DIAGNOSIS — C18.2 MALIGNANT NEOPLASM OF ASCENDING COLON (HCC): Chronic | ICD-10-CM

## 2019-03-27 DIAGNOSIS — J84.112 UIP (USUAL INTERSTITIAL PNEUMONITIS) (HCC): ICD-10-CM

## 2019-03-27 DIAGNOSIS — M15.9 PRIMARY OSTEOARTHRITIS INVOLVING MULTIPLE JOINTS: ICD-10-CM

## 2019-03-27 DIAGNOSIS — J96.11 CHRONIC RESPIRATORY FAILURE WITH HYPOXIA (HCC): ICD-10-CM

## 2019-03-27 DIAGNOSIS — R53.83 FATIGUE, UNSPECIFIED TYPE: ICD-10-CM

## 2019-03-27 DIAGNOSIS — G25.81 RESTLESS LEGS SYNDROME: ICD-10-CM

## 2019-03-27 DIAGNOSIS — K21.9 GASTROESOPHAGEAL REFLUX DISEASE WITHOUT ESOPHAGITIS: ICD-10-CM

## 2019-03-27 DIAGNOSIS — Z13.220 SCREENING, LIPID: Primary | ICD-10-CM

## 2019-03-27 DIAGNOSIS — N39.46 URINARY INCONTINENCE, MIXED: ICD-10-CM

## 2019-03-27 LAB
A/G RATIO: 1.1 (ref 1.1–2.2)
ALBUMIN SERPL-MCNC: 3.7 G/DL (ref 3.4–5)
ALP BLD-CCNC: 102 U/L (ref 40–129)
ALT SERPL-CCNC: 39 U/L (ref 10–40)
ANION GAP SERPL CALCULATED.3IONS-SCNC: 9 MMOL/L (ref 3–16)
AST SERPL-CCNC: 34 U/L (ref 15–37)
BASOPHILS ABSOLUTE: 0 K/UL (ref 0–0.2)
BASOPHILS RELATIVE PERCENT: 0.7 %
BILIRUB SERPL-MCNC: 0.4 MG/DL (ref 0–1)
BUN BLDV-MCNC: 17 MG/DL (ref 7–20)
CALCIUM SERPL-MCNC: 9.3 MG/DL (ref 8.3–10.6)
CHLORIDE BLD-SCNC: 105 MMOL/L (ref 99–110)
CHOLESTEROL, TOTAL: 94 MG/DL (ref 0–199)
CO2: 28 MMOL/L (ref 21–32)
CREAT SERPL-MCNC: 0.9 MG/DL (ref 0.6–1.2)
EOSINOPHILS ABSOLUTE: 0 K/UL (ref 0–0.6)
EOSINOPHILS RELATIVE PERCENT: 1.1 %
GFR AFRICAN AMERICAN: >60
GFR NON-AFRICAN AMERICAN: 60
GLOBULIN: 3.3 G/DL
GLUCOSE BLD-MCNC: 89 MG/DL (ref 70–99)
HCT VFR BLD CALC: 35 % (ref 36–48)
HDLC SERPL-MCNC: 36 MG/DL (ref 40–60)
HEMOGLOBIN: 10.9 G/DL (ref 12–16)
LDL CHOLESTEROL CALCULATED: 42 MG/DL
LYMPHOCYTES ABSOLUTE: 0.8 K/UL (ref 1–5.1)
LYMPHOCYTES RELATIVE PERCENT: 20.8 %
MCH RBC QN AUTO: 31.6 PG (ref 26–34)
MCHC RBC AUTO-ENTMCNC: 31.3 G/DL (ref 31–36)
MCV RBC AUTO: 101 FL (ref 80–100)
MONOCYTES ABSOLUTE: 0.3 K/UL (ref 0–1.3)
MONOCYTES RELATIVE PERCENT: 7.6 %
NEUTROPHILS ABSOLUTE: 2.5 K/UL (ref 1.7–7.7)
NEUTROPHILS RELATIVE PERCENT: 69.8 %
PDW BLD-RTO: 18.8 % (ref 12.4–15.4)
PLATELET # BLD: 166 K/UL (ref 135–450)
PMV BLD AUTO: 10.4 FL (ref 5–10.5)
POTASSIUM SERPL-SCNC: 4.9 MMOL/L (ref 3.5–5.1)
RBC # BLD: 3.46 M/UL (ref 4–5.2)
SODIUM BLD-SCNC: 142 MMOL/L (ref 136–145)
TOTAL PROTEIN: 7 G/DL (ref 6.4–8.2)
TRIGL SERPL-MCNC: 79 MG/DL (ref 0–150)
TSH REFLEX: 2.58 UIU/ML (ref 0.27–4.2)
VLDLC SERPL CALC-MCNC: 16 MG/DL
WBC # BLD: 3.6 K/UL (ref 4–11)

## 2019-03-27 PROCEDURE — G8417 CALC BMI ABV UP PARAM F/U: HCPCS | Performed by: FAMILY MEDICINE

## 2019-03-27 PROCEDURE — 1123F ACP DISCUSS/DSCN MKR DOCD: CPT | Performed by: FAMILY MEDICINE

## 2019-03-27 PROCEDURE — 1036F TOBACCO NON-USER: CPT | Performed by: FAMILY MEDICINE

## 2019-03-27 PROCEDURE — 1090F PRES/ABSN URINE INCON ASSESS: CPT | Performed by: FAMILY MEDICINE

## 2019-03-27 PROCEDURE — G8482 FLU IMMUNIZE ORDER/ADMIN: HCPCS | Performed by: FAMILY MEDICINE

## 2019-03-27 PROCEDURE — 0509F URINE INCON PLAN DOCD: CPT | Performed by: FAMILY MEDICINE

## 2019-03-27 PROCEDURE — G8427 DOCREV CUR MEDS BY ELIG CLIN: HCPCS | Performed by: FAMILY MEDICINE

## 2019-03-27 PROCEDURE — G8399 PT W/DXA RESULTS DOCUMENT: HCPCS | Performed by: FAMILY MEDICINE

## 2019-03-27 PROCEDURE — 4040F PNEUMOC VAC/ADMIN/RCVD: CPT | Performed by: FAMILY MEDICINE

## 2019-03-27 PROCEDURE — 99214 OFFICE O/P EST MOD 30 MIN: CPT | Performed by: FAMILY MEDICINE

## 2019-03-27 RX ORDER — OMEPRAZOLE 20 MG/1
CAPSULE, DELAYED RELEASE ORAL
Qty: 30 CAPSULE | Refills: 5 | Status: SHIPPED | OUTPATIENT
Start: 2019-03-27 | End: 2019-10-02 | Stop reason: SDUPTHER

## 2019-03-27 RX ORDER — WHEELCHAIR
EACH MISCELLANEOUS
Qty: 1 EACH | Refills: 0 | Status: SHIPPED | OUTPATIENT
Start: 2019-03-27 | End: 2019-09-03

## 2019-03-27 RX ORDER — ROPINIROLE 1 MG/1
TABLET, FILM COATED ORAL
Qty: 60 TABLET | Refills: 5 | Status: SHIPPED | OUTPATIENT
Start: 2019-03-27 | End: 2019-10-31 | Stop reason: SDUPTHER

## 2019-03-27 ASSESSMENT — PATIENT HEALTH QUESTIONNAIRE - PHQ9
SUM OF ALL RESPONSES TO PHQ QUESTIONS 1-9: 0
SUM OF ALL RESPONSES TO PHQ9 QUESTIONS 1 & 2: 0
SUM OF ALL RESPONSES TO PHQ QUESTIONS 1-9: 0
1. LITTLE INTEREST OR PLEASURE IN DOING THINGS: 0
2. FEELING DOWN, DEPRESSED OR HOPELESS: 0

## 2019-04-01 ENCOUNTER — TELEPHONE (OUTPATIENT)
Dept: INTERNAL MEDICINE CLINIC | Age: 84
End: 2019-04-01

## 2019-04-01 NOTE — TELEPHONE ENCOUNTER
You can send a copy of the progress note from last office visit. I don't know if that is enough information to get a wheelchair covered. If not, they can buy one on their own or she can come in for a visit.

## 2019-04-01 NOTE — TELEPHONE ENCOUNTER
Pt daughter Tip Garcia calling---the pt had blood vessel break in her eye and the daughter wants to know if because of that should the pt stop taking her aspirin. Can you please call daughter Tip Jose. Thanks.

## 2019-04-01 NOTE — TELEPHONE ENCOUNTER
[address] calling---the script and diag codes they received for pt wheelchair will not be enough for the insuranc e company---they need face to face progress notes where you and pt are discussing why she needs a wheel chair---please fax to [phone]. Thanks.

## 2019-04-08 ENCOUNTER — OFFICE VISIT (OUTPATIENT)
Dept: PULMONOLOGY | Age: 84
End: 2019-04-08
Payer: MEDICARE

## 2019-04-08 VITALS — OXYGEN SATURATION: 98 % | DIASTOLIC BLOOD PRESSURE: 77 MMHG | SYSTOLIC BLOOD PRESSURE: 137 MMHG | HEART RATE: 68 BPM

## 2019-04-08 DIAGNOSIS — K21.9 GASTROESOPHAGEAL REFLUX DISEASE WITHOUT ESOPHAGITIS: ICD-10-CM

## 2019-04-08 DIAGNOSIS — J84.112 UIP (USUAL INTERSTITIAL PNEUMONITIS) (HCC): Primary | ICD-10-CM

## 2019-04-08 DIAGNOSIS — J96.11 CHRONIC RESPIRATORY FAILURE WITH HYPOXIA (HCC): ICD-10-CM

## 2019-04-08 DIAGNOSIS — R05.3 CHRONIC COUGH: ICD-10-CM

## 2019-04-08 PROCEDURE — 1036F TOBACCO NON-USER: CPT | Performed by: INTERNAL MEDICINE

## 2019-04-08 PROCEDURE — G8399 PT W/DXA RESULTS DOCUMENT: HCPCS | Performed by: INTERNAL MEDICINE

## 2019-04-08 PROCEDURE — 4040F PNEUMOC VAC/ADMIN/RCVD: CPT | Performed by: INTERNAL MEDICINE

## 2019-04-08 PROCEDURE — 1090F PRES/ABSN URINE INCON ASSESS: CPT | Performed by: INTERNAL MEDICINE

## 2019-04-08 PROCEDURE — G8417 CALC BMI ABV UP PARAM F/U: HCPCS | Performed by: INTERNAL MEDICINE

## 2019-04-08 PROCEDURE — 99214 OFFICE O/P EST MOD 30 MIN: CPT | Performed by: INTERNAL MEDICINE

## 2019-04-08 PROCEDURE — G8427 DOCREV CUR MEDS BY ELIG CLIN: HCPCS | Performed by: INTERNAL MEDICINE

## 2019-04-08 PROCEDURE — 1123F ACP DISCUSS/DSCN MKR DOCD: CPT | Performed by: INTERNAL MEDICINE

## 2019-04-08 NOTE — PROGRESS NOTES
[facility] [place]  Progress Note  Nolan Patton MD       Emmanuelle Smith   YOB: 1935    Date of Visit:  4/8/2019    Assessment/Plan:  1. UIP (usual interstitial pneumonitis) ([address]  · I have independently reviewed radiographic images for this patient as part of this visit. · I have independently reviewed pulmonary function testing. · CT imaging does reveal continued evidence of UIP with honeycomb fibrosis, traction bronchiectasis and heterogeneous distribution of fibrosis. 11/18 imaging was stable  · OFEV made her sick in 2015  · Try her on Esbriet and see if she tolerates that    2. Chronic respiratory failure with hypoxia (HCC)  · O2 saturation falls to 81% walking <200 feet on flat ground  · She will benefit from O2 with sleep and exertion    3. Gastroesophageal reflux disease without esophagitis  · Continue Prilosec  · Concern that ongoing GERD could contribute to worsening lung disease  · Symptomatically, she is pretty well controlled    4. Cough  · This is her major symptom  · This is a stable problem without significant change in the last 12 months      FOLLOW UP: One year. Chief Complaint   Patient presents with    Cough     3 month f.u.       HPI  The patient presentwith a chief complaint of shortness of breath, cough due to UIP. She feels more short of breath and weaker since her last visit. There is no complaint of chest pain, nausea or vomiting. Review of Systems  As documented in HPI     Physical Exam:  Well developed, well nourished  Alert and oriented  Sclera is clear  No cervical adenopathy  No JVD. Chest examination is basilar inspiratory crackles. .  Cardiac examination reveals regular rate and rhythm without murmur, gallop or rub. The abdomen is soft, nontender and nondistended. There is no clubbing, cyanosis or edema of the extremities. There is no obvious skin rash.   No focal neuro deficicts  Normal mood and

## 2019-04-16 ENCOUNTER — TELEPHONE (OUTPATIENT)
Dept: PULMONOLOGY | Age: 84
End: 2019-04-16

## 2019-04-16 NOTE — TELEPHONE ENCOUNTER
Valerie Willett with [facility] called in to check on the status of the PA for Pt's Esbriet.     Valerie Willett # [phone] [id number]

## 2019-04-18 ENCOUNTER — HOSPITAL ENCOUNTER (INPATIENT)
Age: 84
LOS: 4 days | Discharge: HOME | DRG: 196 | End: 2019-04-22
Attending: EMERGENCY MEDICINE | Admitting: INTERNAL MEDICINE
Payer: MEDICARE

## 2019-04-18 ENCOUNTER — APPOINTMENT (OUTPATIENT)
Dept: GENERAL RADIOLOGY | Age: 84
DRG: 196 | End: 2019-04-18
Payer: MEDICARE

## 2019-04-18 DIAGNOSIS — R06.02 SHORTNESS OF BREATH: Primary | ICD-10-CM

## 2019-04-18 DIAGNOSIS — R09.02 HYPOXIA: ICD-10-CM

## 2019-04-18 DIAGNOSIS — J84.10 PULMONARY FIBROSIS (HCC): ICD-10-CM

## 2019-04-18 DIAGNOSIS — Z87.09 HISTORY OF PULMONARY FIBROSIS: ICD-10-CM

## 2019-04-18 LAB
A/G RATIO: 0.9 (ref 1.1–2.2)
ALBUMIN SERPL-MCNC: 3.4 G/DL (ref 3.4–5)
ALP BLD-CCNC: 108 U/L (ref 40–129)
ALT SERPL-CCNC: 33 U/L (ref 10–40)
ANION GAP SERPL CALCULATED.3IONS-SCNC: 10 MMOL/L (ref 3–16)
APTT: 32.1 SEC (ref 26–36)
AST SERPL-CCNC: 48 U/L (ref 15–37)
BACTERIA: ABNORMAL /HPF
BASE EXCESS VENOUS: 2.4 MMOL/L (ref -3–3)
BASOPHILS ABSOLUTE: 0 K/UL (ref 0–0.2)
BASOPHILS RELATIVE PERCENT: 0.3 %
BILIRUB SERPL-MCNC: 0.4 MG/DL (ref 0–1)
BILIRUBIN URINE: NEGATIVE
BLOOD, URINE: ABNORMAL
BUN BLDV-MCNC: 22 MG/DL (ref 7–20)
CALCIUM SERPL-MCNC: 8.4 MG/DL (ref 8.3–10.6)
CARBOXYHEMOGLOBIN: 2.7 % (ref 0–1.5)
CHLORIDE BLD-SCNC: 103 MMOL/L (ref 99–110)
CLARITY: ABNORMAL
CO2: 25 MMOL/L (ref 21–32)
COLOR: YELLOW
CREAT SERPL-MCNC: 0.8 MG/DL (ref 0.6–1.2)
EOSINOPHILS ABSOLUTE: 0 K/UL (ref 0–0.6)
EOSINOPHILS RELATIVE PERCENT: 0.9 %
EPITHELIAL CELLS, UA: 1 /HPF (ref 0–5)
GFR AFRICAN AMERICAN: >60
GFR NON-AFRICAN AMERICAN: >60
GLOBULIN: 3.8 G/DL
GLUCOSE BLD-MCNC: 125 MG/DL (ref 70–99)
GLUCOSE URINE: NEGATIVE MG/DL
HCO3 VENOUS: 27.8 MMOL/L (ref 23–29)
HCT VFR BLD CALC: 34.2 % (ref 36–48)
HEMOGLOBIN, VEN, REDUCED: 13 %
HEMOGLOBIN: 10.8 G/DL (ref 12–16)
HYALINE CASTS: 1 /LPF (ref 0–8)
INR BLD: 1.11 (ref 0.86–1.14)
KETONES, URINE: NEGATIVE MG/DL
LACTIC ACID: 1.1 MMOL/L (ref 0.4–2)
LEUKOCYTE ESTERASE, URINE: NEGATIVE
LYMPHOCYTES ABSOLUTE: 0.5 K/UL (ref 1–5.1)
LYMPHOCYTES RELATIVE PERCENT: 13.5 %
MAGNESIUM: 2 MG/DL (ref 1.8–2.4)
MCH RBC QN AUTO: 31.9 PG (ref 26–34)
MCHC RBC AUTO-ENTMCNC: 31.4 G/DL (ref 31–36)
MCV RBC AUTO: 101.6 FL (ref 80–100)
METHEMOGLOBIN VENOUS: 0.5 %
MICROSCOPIC EXAMINATION: YES
MONOCYTES ABSOLUTE: 0.2 K/UL (ref 0–1.3)
MONOCYTES RELATIVE PERCENT: 5.9 %
NEUTROPHILS ABSOLUTE: 2.7 K/UL (ref 1.7–7.7)
NEUTROPHILS RELATIVE PERCENT: 79.4 %
NITRITE, URINE: POSITIVE
O2 CONTENT, VEN: 13 VOL %
O2 SAT, VEN: 86 %
O2 THERAPY: ABNORMAL
PCO2, VEN: 46 MMHG (ref 40–50)
PDW BLD-RTO: 18.2 % (ref 12.4–15.4)
PH UA: 6 (ref 5–8)
PH VENOUS: 7.39 (ref 7.35–7.45)
PLATELET # BLD: 121 K/UL (ref 135–450)
PMV BLD AUTO: 10 FL (ref 5–10.5)
PO2, VEN: 52.5 MMHG (ref 25–40)
POTASSIUM SERPL-SCNC: 5 MMOL/L (ref 3.5–5.1)
PRO-BNP: 6609 PG/ML (ref 0–449)
PROTEIN UA: NEGATIVE MG/DL
PROTHROMBIN TIME: 12.6 SEC (ref 9.8–13)
RBC # BLD: 3.37 M/UL (ref 4–5.2)
RBC UA: 1 /HPF (ref 0–4)
SODIUM BLD-SCNC: 138 MMOL/L (ref 136–145)
SPECIFIC GRAVITY UA: 1.02 (ref 1–1.03)
TCO2 CALC VENOUS: 66 MMOL/L
TOTAL PROTEIN: 7.2 G/DL (ref 6.4–8.2)
TROPONIN: <0.01 NG/ML
URINE REFLEX TO CULTURE: YES
URINE TYPE: ABNORMAL
UROBILINOGEN, URINE: 1 E.U./DL
WBC # BLD: 3.4 K/UL (ref 4–11)
WBC UA: 4 /HPF (ref 0–5)

## 2019-04-18 PROCEDURE — 87077 CULTURE AEROBIC IDENTIFY: CPT

## 2019-04-18 PROCEDURE — 83880 ASSAY OF NATRIURETIC PEPTIDE: CPT

## 2019-04-18 PROCEDURE — 82803 BLOOD GASES ANY COMBINATION: CPT

## 2019-04-18 PROCEDURE — 81001 URINALYSIS AUTO W/SCOPE: CPT

## 2019-04-18 PROCEDURE — 83735 ASSAY OF MAGNESIUM: CPT

## 2019-04-18 PROCEDURE — 83605 ASSAY OF LACTIC ACID: CPT

## 2019-04-18 PROCEDURE — 85025 COMPLETE CBC W/AUTO DIFF WBC: CPT

## 2019-04-18 PROCEDURE — 1200000000 HC SEMI PRIVATE

## 2019-04-18 PROCEDURE — 87086 URINE CULTURE/COLONY COUNT: CPT

## 2019-04-18 PROCEDURE — 93005 ELECTROCARDIOGRAM TRACING: CPT | Performed by: EMERGENCY MEDICINE

## 2019-04-18 PROCEDURE — 85730 THROMBOPLASTIN TIME PARTIAL: CPT

## 2019-04-18 PROCEDURE — 71045 X-RAY EXAM CHEST 1 VIEW: CPT

## 2019-04-18 PROCEDURE — 6370000000 HC RX 637 (ALT 250 FOR IP): Performed by: PHYSICIAN ASSISTANT

## 2019-04-18 PROCEDURE — 84484 ASSAY OF TROPONIN QUANT: CPT

## 2019-04-18 PROCEDURE — 87186 SC STD MICRODIL/AGAR DIL: CPT

## 2019-04-18 PROCEDURE — 99285 EMERGENCY DEPT VISIT HI MDM: CPT

## 2019-04-18 PROCEDURE — 85610 PROTHROMBIN TIME: CPT

## 2019-04-18 PROCEDURE — 80053 COMPREHEN METABOLIC PANEL: CPT

## 2019-04-18 PROCEDURE — 94640 AIRWAY INHALATION TREATMENT: CPT

## 2019-04-18 PROCEDURE — 87040 BLOOD CULTURE FOR BACTERIA: CPT

## 2019-04-18 RX ORDER — IPRATROPIUM BROMIDE AND ALBUTEROL SULFATE 2.5; .5 MG/3ML; MG/3ML
1 SOLUTION RESPIRATORY (INHALATION) ONCE
Status: COMPLETED | OUTPATIENT
Start: 2019-04-18 | End: 2019-04-18

## 2019-04-18 RX ORDER — SODIUM CHLORIDE 0.9 % (FLUSH) 0.9 %
10 SYRINGE (ML) INJECTION PRN
Status: DISCONTINUED | OUTPATIENT
Start: 2019-04-18 | End: 2019-04-22 | Stop reason: HOSPADM

## 2019-04-18 RX ORDER — LANOLIN ALCOHOL/MO/W.PET/CERES
10 CREAM (GRAM) TOPICAL NIGHTLY PRN
COMMUNITY

## 2019-04-18 RX ORDER — SODIUM CHLORIDE 0.9 % (FLUSH) 0.9 %
10 SYRINGE (ML) INJECTION EVERY 12 HOURS SCHEDULED
Status: DISCONTINUED | OUTPATIENT
Start: 2019-04-19 | End: 2019-04-22 | Stop reason: HOSPADM

## 2019-04-18 RX ORDER — ONDANSETRON 2 MG/ML
4 INJECTION INTRAMUSCULAR; INTRAVENOUS EVERY 6 HOURS PRN
Status: DISCONTINUED | OUTPATIENT
Start: 2019-04-18 | End: 2019-04-22 | Stop reason: HOSPADM

## 2019-04-18 RX ADMIN — IPRATROPIUM BROMIDE AND ALBUTEROL SULFATE 1 AMPULE: .5; 3 SOLUTION RESPIRATORY (INHALATION) at 18:35

## 2019-04-18 ASSESSMENT — ENCOUNTER SYMPTOMS
BACK PAIN: 0
COLOR CHANGE: 0
WHEEZING: 1
ABDOMINAL DISTENTION: 0
SHORTNESS OF BREATH: 1
VOMITING: 0
ABDOMINAL PAIN: 0
CONSTIPATION: 0
STRIDOR: 0
DIARRHEA: 0
COUGH: 1
NAUSEA: 0

## 2019-04-18 NOTE — ED NOTES
Pt resting comfortably with no signs of distress. Denies any needs at this time. Bed locked and in lowest position with both side rails raise. Call light within reach.        Taty Hauser RN  04/18/19 5543

## 2019-04-18 NOTE — ED NOTES
Pt ambulated to bedside commode, SpO2 dropped to 84% on 3LPM O2. O2 increased to 5LPM and SpO2 improved to 95%. Pt stated she became more SOB with movement.      Allen Castillo RN  04/18/19 1000

## 2019-04-18 NOTE — ED PROVIDER NOTES
Negative for color change, pallor, rash and wound. Neurological: Positive for weakness and light-headedness. Negative for dizziness, tremors, seizures, syncope, facial asymmetry, speech difficulty, numbness and headaches. Hematological: Negative. Psychiatric/Behavioral: Negative for confusion. All other systems reviewed and are negative. Positives and Pertinent negatives as per HPI. Except as noted abovein the ROS, all other systems were reviewed and negative. PAST MEDICAL HISTORY     Past Medical History:   Diagnosis Date    Acute biliary pancreatitis without infection or necrosis 4/11/2018    Bundle branch block, right     Colon cancer ([address] 04/2018    [place]    Depression     DJD (degenerative joint disease)     primarily knees    Gallstones     GERD (gastroesophageal reflux disease)     Macular degeneration     significant    Urinary incontinence, mixed          SURGICAL HISTORY     Past Surgical History:   Procedure Laterality Date    APPENDECTOMY  4/20/2108    Incidental during colectomy for cancer    BLADDER SURGERY  2000    tuck    CHOLECYSTECTOMY, OPEN  04/20/2018    HYSTERECTOMY  2000    ? prob with bladder tuck    RIGHT COLECTOMY  04/29/2018    19.5 cm (7.5 in);  Dr. Kd Mckinley at [address] [place] [address]       Previous Medications    DIPHENHYDRAMINE-APAP, SLEEP, (TYLENOL PM EXTRA STRENGTH PO)    Take  by mouth nightly. FERROUS SULFATE 325 (65 FE) MG TABLET    Take 325 mg by mouth daily (with breakfast)    GABAPENTIN (NEURONTIN) 100 MG CAPSULE    TAKE TWO CAPSULES BY MOUTH TWICE A DAY, TAKE AT SUPPER AND BEDTIME FOR RESTLESS LEGS    HYDROCORTISONE (WESTCORT) 0.2 % CREAM    Apply twice daily as needed to skin scale or rash. MISC. DEVICES [facility]) MISC    Use when short of breath and needs to move around without ambulation. MULTIVITAMIN (THERAGRAN) PER TABLET    Take 1 tablet by mouth daily.     OMEPRAZOLE (PRILOSEC) 20 MG DELAYED RELEASE CAPSULE TAKE ONE CAPSULE BY MOUTH DAILY    OXYBUTYNIN (DITROPAN-XL) 10 MG EXTENDED RELEASE TABLET    TAKE ONE TABLET BY MOUTH DAILY    ROPINIROLE (REQUIP) 1 MG TABLET    TAKE TWO TABLETS BY MOUTH EVERY NIGHT AT BEDTIME         ALLERGIES     Pcn [penicillins]    FAMILYHISTORY       Family History   Problem Relation Age of Onset    Diabetes Mother     Coronary Art Dis Mother         young onset   Michaelle Lighter Stroke Mother     COPD Brother         pulmonary fibrosis    Prostate Cancer Brother     COPD Brother     Cancer Brother         liver, skin    Stroke Daughter           SOCIAL HISTORY       Social History     Socioeconomic History    Marital status:      Spouse name: None    Number of children: 6    Years of education: None    Highest education level: None   Occupational History    Occupation: homemaker   Social Needs    Financial resource strain: None    Food insecurity:     Worry: None     Inability: None    Transportation needs:     Medical: None     Non-medical: None   Tobacco Use    Smoking status: Never Smoker    Smokeless tobacco: Never Used   Substance and Sexual Activity    Alcohol use: No    Drug use: No    Sexual activity: None   Lifestyle    Physical activity:     Days per week: None     Minutes per session: None    Stress: None   Relationships    Social connections:     Talks on phone: None     Gets together: None     Attends Hindu service: None     Active member of club or organization: None     Attends meetings of clubs or organizations: None     Relationship status: None    Intimate partner violence:     Fear of current or ex partner: None     Emotionally abused: None     Physically abused: None     Forced sexual activity: None   Other Topics Concern    None   Social History Narrative    Lives with daughter Henry Madsen in 2 story home w/basement but bedroom and bath on 1st level. Does not drive. Trouble reading due to macular.        SCREENINGS             PHYSICAL EXAM    (up components within normal limits    Narrative:     Performed at:  [facility]  [address] [facility]  [place], [address]   Phone [phone]   BLOOD GAS, VENOUS - Abnormal; Notable for the following components:    pO2, Neo 52.5 (*)     Carboxyhemoglobin 2.7 (*)     All other components within normal limits    Narrative:     Performed at:  [facility]  [address],  [place], [address]   Phone [phone]   CULTURE BLOOD #2   CULTURE BLOOD #1   COMPREHENSIVE METABOLIC PANEL   TROPONIN   APTT   PROTIME-INR   BRAIN NATRIURETIC PEPTIDE   LACTIC ACID, PLASMA   LACTIC ACID, PLASMA   MAGNESIUM   URINE RT REFLEX TO CULTURE       All other labs were within normal range or not returned as of this dictation. EKG: All EKG's are interpreted by the Emergency Department Physician who either signs orCo-signs this chart in the absence of a cardiologist.  Please see their note for interpretation of EKG. RADIOLOGY:   Non-plain film images such as CT, Ultrasound and MRI are read by the radiologist. Plain radiographic images are visualized andpreliminarily interpreted by the  ED Provider with the below findings:        Interpretation perthe Radiologist below, if available at the time of this note:    XR CHEST PORTABLE   Final Result   Previously described chronic increased lung markings suggest pulmonary   fibrosis. There is no focal consolidation appreciated. The chronic changes   could obscure subtle airspace disease. Xr Chest Portable    Result Date: 4/18/2019  EXAMINATION: SINGLE XRAY VIEW OF THE CHEST 4/18/2019 6:09 pm COMPARISON: Images from chest CT scan 01/21/2018 HISTORY: ORDERING SYSTEM PROVIDED HISTORY: SOB TECHNOLOGIST PROVIDED HISTORY: Reason for exam:->SOB Ordering Physician Provided Reason for Exam: SOB Acuity: Acute Type of Exam: Initial FINDINGS: Mediastinal silhouette appears prominent but stable.   Pulmonary vascular markings are

## 2019-04-18 NOTE — ED NOTES
Bed: 09  Expected date:   Expected time:   Means of arrival:   Comments:  triage     Kobe Flowers RN  04/18/19 3980

## 2019-04-19 ENCOUNTER — APPOINTMENT (OUTPATIENT)
Dept: CT IMAGING | Age: 84
DRG: 196 | End: 2019-04-19
Payer: MEDICARE

## 2019-04-19 PROBLEM — J96.01 ACUTE RESPIRATORY FAILURE WITH HYPOXIA (HCC): Status: ACTIVE | Noted: 2019-04-19

## 2019-04-19 LAB — PROCALCITONIN: 0.05 NG/ML (ref 0–0.15)

## 2019-04-19 PROCEDURE — 94760 N-INVAS EAR/PLS OXIMETRY 1: CPT

## 2019-04-19 PROCEDURE — 6360000002 HC RX W HCPCS: Performed by: INTERNAL MEDICINE

## 2019-04-19 PROCEDURE — 94150 VITAL CAPACITY TEST: CPT

## 2019-04-19 PROCEDURE — 97530 THERAPEUTIC ACTIVITIES: CPT

## 2019-04-19 PROCEDURE — 6370000000 HC RX 637 (ALT 250 FOR IP): Performed by: NURSE PRACTITIONER

## 2019-04-19 PROCEDURE — 93010 ELECTROCARDIOGRAM REPORT: CPT | Performed by: INTERNAL MEDICINE

## 2019-04-19 PROCEDURE — 97161 PT EVAL LOW COMPLEX 20 MIN: CPT

## 2019-04-19 PROCEDURE — 1200000000 HC SEMI PRIVATE

## 2019-04-19 PROCEDURE — 36415 COLL VENOUS BLD VENIPUNCTURE: CPT

## 2019-04-19 PROCEDURE — 6370000000 HC RX 637 (ALT 250 FOR IP): Performed by: INTERNAL MEDICINE

## 2019-04-19 PROCEDURE — 99223 1ST HOSP IP/OBS HIGH 75: CPT | Performed by: INTERNAL MEDICINE

## 2019-04-19 PROCEDURE — 94640 AIRWAY INHALATION TREATMENT: CPT

## 2019-04-19 PROCEDURE — 97165 OT EVAL LOW COMPLEX 30 MIN: CPT

## 2019-04-19 PROCEDURE — 84145 PROCALCITONIN (PCT): CPT

## 2019-04-19 PROCEDURE — 71250 CT THORAX DX C-: CPT

## 2019-04-19 PROCEDURE — 2580000003 HC RX 258: Performed by: INTERNAL MEDICINE

## 2019-04-19 RX ORDER — ACETAMINOPHEN 80 MG
TABLET,CHEWABLE ORAL
Status: COMPLETED
Start: 2019-04-19 | End: 2019-04-19

## 2019-04-19 RX ORDER — OXYBUTYNIN CHLORIDE 5 MG/1
10 TABLET, EXTENDED RELEASE ORAL DAILY
Status: DISCONTINUED | OUTPATIENT
Start: 2019-04-19 | End: 2019-04-22 | Stop reason: HOSPADM

## 2019-04-19 RX ORDER — ACETAMINOPHEN,DIPHENHYDRAMINE HCL 500; 25 MG/1; MG/1
1 TABLET, FILM COATED ORAL NIGHTLY PRN
Status: DISCONTINUED | OUTPATIENT
Start: 2019-04-19 | End: 2019-04-19 | Stop reason: CLARIF

## 2019-04-19 RX ORDER — LANOLIN ALCOHOL/MO/W.PET/CERES
10 CREAM (GRAM) TOPICAL NIGHTLY PRN
Status: DISCONTINUED | OUTPATIENT
Start: 2019-04-19 | End: 2019-04-22 | Stop reason: HOSPADM

## 2019-04-19 RX ORDER — DIPHENHYDRAMINE HCL 25 MG
25 TABLET ORAL NIGHTLY PRN
Status: DISCONTINUED | OUTPATIENT
Start: 2019-04-19 | End: 2019-04-22 | Stop reason: HOSPADM

## 2019-04-19 RX ORDER — ACETAMINOPHEN 500 MG
500 TABLET ORAL NIGHTLY PRN
Status: DISCONTINUED | OUTPATIENT
Start: 2019-04-19 | End: 2019-04-22 | Stop reason: HOSPADM

## 2019-04-19 RX ORDER — IPRATROPIUM BROMIDE AND ALBUTEROL SULFATE 2.5; .5 MG/3ML; MG/3ML
1 SOLUTION RESPIRATORY (INHALATION)
Status: DISCONTINUED | OUTPATIENT
Start: 2019-04-19 | End: 2019-04-22 | Stop reason: HOSPADM

## 2019-04-19 RX ORDER — ROPINIROLE 1 MG/1
2 TABLET, FILM COATED ORAL EVERY EVENING
Status: DISCONTINUED | OUTPATIENT
Start: 2019-04-19 | End: 2019-04-22 | Stop reason: HOSPADM

## 2019-04-19 RX ORDER — MULTIVITAMIN WITH FOLIC ACID 400 MCG
1 TABLET ORAL DAILY
Status: DISCONTINUED | OUTPATIENT
Start: 2019-04-19 | End: 2019-04-22 | Stop reason: HOSPADM

## 2019-04-19 RX ORDER — METHYLPREDNISOLONE SODIUM SUCCINATE 40 MG/ML
40 INJECTION, POWDER, LYOPHILIZED, FOR SOLUTION INTRAMUSCULAR; INTRAVENOUS EVERY 6 HOURS
Status: DISCONTINUED | OUTPATIENT
Start: 2019-04-19 | End: 2019-04-20

## 2019-04-19 RX ADMIN — ENOXAPARIN SODIUM 40 MG: 40 INJECTION SUBCUTANEOUS at 09:47

## 2019-04-19 RX ADMIN — Medication 10 ML: at 09:48

## 2019-04-19 RX ADMIN — Medication: at 02:45

## 2019-04-19 RX ADMIN — OXYBUTYNIN CHLORIDE 10 MG: 5 TABLET, EXTENDED RELEASE ORAL at 09:47

## 2019-04-19 RX ADMIN — METHYLPREDNISOLONE SODIUM SUCCINATE 40 MG: 40 INJECTION, POWDER, FOR SOLUTION INTRAMUSCULAR; INTRAVENOUS at 13:08

## 2019-04-19 RX ADMIN — ROPINIROLE HYDROCHLORIDE 2 MG: 1 TABLET, FILM COATED ORAL at 02:09

## 2019-04-19 RX ADMIN — MELATONIN TAB 3 MG 10.5 MG: 3 TAB at 02:08

## 2019-04-19 RX ADMIN — IPRATROPIUM BROMIDE AND ALBUTEROL SULFATE 1 AMPULE: .5; 3 SOLUTION RESPIRATORY (INHALATION) at 20:53

## 2019-04-19 RX ADMIN — METHYLPREDNISOLONE SODIUM SUCCINATE 40 MG: 40 INJECTION, POWDER, FOR SOLUTION INTRAMUSCULAR; INTRAVENOUS at 18:03

## 2019-04-19 RX ADMIN — Medication 10 ML: at 21:04

## 2019-04-19 RX ADMIN — MELATONIN TAB 3 MG 10.5 MG: 3 TAB at 21:04

## 2019-04-19 RX ADMIN — THERA TABS 1 TABLET: TAB at 09:47

## 2019-04-19 RX ADMIN — DIPHENHYDRAMINE HCL 25 MG: 25 TABLET ORAL at 02:08

## 2019-04-19 RX ADMIN — ROPINIROLE HYDROCHLORIDE 2 MG: 1 TABLET, FILM COATED ORAL at 21:04

## 2019-04-19 RX ADMIN — ACETAMINOPHEN 500 MG: 500 TABLET ORAL at 02:07

## 2019-04-19 ASSESSMENT — PAIN DESCRIPTION - LOCATION
LOCATION: CHEST

## 2019-04-19 ASSESSMENT — PAIN DESCRIPTION - ORIENTATION: ORIENTATION: RIGHT

## 2019-04-19 ASSESSMENT — PAIN SCALES - GENERAL
PAINLEVEL_OUTOF10: 2
PAINLEVEL_OUTOF10: 0
PAINLEVEL_OUTOF10: 2
PAINLEVEL_OUTOF10: 5
PAINLEVEL_OUTOF10: 0
PAINLEVEL_OUTOF10: 5
PAINLEVEL_OUTOF10: 5

## 2019-04-19 ASSESSMENT — PAIN DESCRIPTION - PROGRESSION: CLINICAL_PROGRESSION: GRADUALLY WORSENING

## 2019-04-19 NOTE — CARE COORDINATION
Discharge Planning Assessment   discharge planner met with patient and family member to discuss reason for admission, current living situation, and potential needs at the time of discharge. Demographics/Insurance verified Yes    Current type of dwelling: House    Patient from ECF/ confirmed with: N/A    Living arrangements:  Dtr    Level of function/Support: Assisted    PCP: Dr. Cortez Das    Last Visit to PCP: March    DME: Oxygen w/ Debbe Abler, walkers, and wheel chairs    Active with any community resources/agencies/skilled home care:  None    Medication compliance issues: Denied    Financial issues that could impact healthcare: Denied    Transportation at the time of discharge: Son (lives across the street)    Tentative discharge plan: Await therapy evals for recommendations. Will continue to follow for support and discharge planning.  Douglas Hernández, MSW, LSW

## 2019-04-19 NOTE — H&P
Hospital Medicine History & Physical      PCP: Cee Adkins MD    Date of Admission: 4/18/2019    Date of Service: Pt seen/examined on 4/18/2019 and Admitted to Inpatient with expected LOS greater than two midnights due to medical therapy. Chief Complaint:  Increasing Shortness of breath      History Of Present Illness:      80 y.o. female with PMHx of Pulmonary Fibrosis presented to [facility] with worsening shortness of breath and cough. Pt recently returned from vacation and notes having more trouble breathing since her return. She denies fever/chills or body aches. No sputum production or change in cough. She sees Dr Kal Evangelista for Pulmonary Fibrosis and currently uses 3L O2. Pt was found to be hypoxic in ED on her home 3 Liters and was titrated up. There is no infectious etiology. Pt will be admitted for pulmonary toileting and will consult pulmonology. Past Medical History:          Diagnosis Date    Acute biliary pancreatitis without infection or necrosis 4/11/2018    Bundle branch block, right     Colon cancer ([address] 04/2018    [facility]    Depression     DJD (degenerative joint disease)     primarily knees    Gallstones     GERD (gastroesophageal reflux disease)     Macular degeneration     significant    Urinary incontinence, mixed        Past Surgical History:          Procedure Laterality Date    APPENDECTOMY  4/20/2108    Incidental during colectomy for cancer    BLADDER SURGERY  2000    tuck    CHOLECYSTECTOMY, OPEN  04/20/2018    HYSTERECTOMY  2000    ? prob with bladder tuck    RIGHT COLECTOMY  04/29/2018    19.5 cm (7.5 in);  Dr. Annika Celis at [facility]       Medications Prior to Admission:      Prior to Admission medications    Medication Sig Start Date End Date Taking?  Authorizing Provider   melatonin 3 MG TABS tablet Take 10 mg by mouth nightly as needed   Yes Historical Provider, MD   rOPINIRole (REQUIP) 1 MG tablet TAKE TWO TABLETS BY MOUTH EVERY NIGHT AT BEDTIME 3/27/19  Yes Margarita Pino MD   omeprazole (PRILOSEC) 20 MG delayed release capsule TAKE ONE CAPSULE BY MOUTH DAILY 3/27/19  Yes Margarita Pino MD   Misc. Devices [facility]) MISC Use when short of breath and needs to move around without ambulation. 3/27/19  Yes Margarita Pino MD   gabapentin (NEURONTIN) 100 MG capsule TAKE TWO CAPSULES BY MOUTH TWICE A DAY, TAKE AT SUPPER AND BEDTIME FOR RESTLESS LEGS 3/21/19 6/19/19 Yes Margarita Pino MD   oxybutynin (DITROPAN-XL) 10 MG extended release tablet TAKE ONE TABLET BY MOUTH DAILY 9/26/18  Yes Margarita Pino MD   multivitamin [facility]) per tablet Take 1 tablet by mouth daily. Yes Historical Provider, MD   Diphenhydramine-APAP, sleep, (TYLENOL PM EXTRA STRENGTH PO) Take  by mouth nightly. Yes Historical Provider, MD       Allergies:  Pcn [penicillins]    Social History:      The patient currently lives home    TOBACCO:   reports that she has never smoked. She has never used smokeless tobacco.  ETOH:   reports that she does not drink alcohol. Family History:      Reviewed in detail positive as follows:        Problem Relation Age of Onset   [place] Diabetes Mother     Coronary Art Dis Mother         young onset   [place] Stroke Mother     COPD Brother         pulmonary fibrosis    Prostate Cancer Brother     COPD Brother     Cancer Brother         liver, skin    Stroke Daughter        REVIEW OF SYSTEMS:   Pertinent positives as noted in the HPI. All other systems reviewed and negative. PHYSICAL EXAM PERFORMED:    BP (!) 141/77   Pulse 92   Temp 97.5 °F (36.4 °C) (Temporal)   Resp 18   Ht 5' 3\" (1.6 m)   Wt 165 lb (74.8 kg)   SpO2 99%   BMI 29.23 kg/m²     General appearance:  No apparent distress, appears stated age and cooperative. HEENT:  Normal cephalic, atraumatic without obvious deformity. Pupils equal, round, and reactive to light. Extra ocular muscles intact. Conjunctivae/corneas clear.   Neck: Supple, with full range of motion. No jugular venous distention. Trachea midline. Respiratory:  Normal respiratory effort. Diminished breath sounds with bibasilar crackles. Cardiovascular:  Regular rate and rhythm without murmurs, rubs or gallops. Abdomen: Soft, non-tender, non-distended, without rebound or guarding. Normal bowel sounds. Musculoskeletal:  No clubbing, cyanosis or edema bilaterally. Full range of motion without deformity. Skin: Skin color, texture, turgor normal.  No rashes or lesions. Neurologic:  Neurovascularly intact without any focal sensory/motor deficits. Cranial nerves: II-XII intact, grossly non-focal.  Psychiatric:  Alert and oriented, thought content appropriate, normal insight  Capillary Refill: Brisk,< 3 seconds   Peripheral Pulses: +2 palpable, equal bilaterally       Labs:     Recent Labs     04/18/19  1838   WBC 3.4*   HGB 10.8*   HCT 34.2*   *     Recent Labs     04/18/19  1838      K 5.0      CO2 25   BUN 22*   CREATININE 0.8   CALCIUM 8.4     Recent Labs     04/18/19  1838   AST 48*   ALT 33   BILITOT 0.4   ALKPHOS 108     Recent Labs     04/18/19  1838   INR 1.11     Recent Labs     04/18/19  1838   TROPONINI <0.01       Urinalysis:      Lab Results   Component Value Date    NITRU POSITIVE 04/18/2019    WBCUA 4 04/18/2019    BACTERIA 4+ 04/18/2019    RBCUA 1 04/18/2019    BLOODU TRACE 04/18/2019    SPECGRAV 1.022 04/18/2019    GLUCOSEU Negative 04/18/2019       Radiology:     CXR: I have reviewed the CXR with the following interpretation: see below      XR CHEST PORTABLE   Final Result   Previously described chronic increased lung markings suggest pulmonary   fibrosis. There is no focal consolidation appreciated. The chronic changes   could obscure subtle airspace disease.              ASSESSMENT:    Active Hospital Problems    Diagnosis Date Noted    Acute respiratory failure with hypoxia ([address] [J96.01] 04/19/2019    Pulmonary fibrosis ([address] [J84.10] 04/18/2019         PLAN:    Pulmonary Fibrosis -  UIP  - CT evidence of honeycomb fibrosis, traction bronchiectasis and heterogeneous distribution of fibrosis. 11/18 CT stable  - currently on 3L but desats with any exertion  - O2 prn keep SaO2 92%  - consult Pulmonology    Acute respiratory failure/Hypoxia/Shortness of Breath - due to above; provide supplemental oxygen as necessary to keep SaO2 92% or greater. DVT Prophylaxis: Lovenox  Diet: DIET GENERAL;  Code Status: Full Code    PT/OT Eval Status: pending    [address] [facility], [facility] [place]    Thank you William Rolle MD for the opportunity to be involved in this patient's care. If you have any questions or concerns please feel free to contact me at [phone].

## 2019-04-19 NOTE — ED PROVIDER NOTES
This patient was seen by the Mid-Level Provider. I have seen and examined the patient, agree with the workup, evaluation, management and diagnosis. Care plan has been discussed. My assessment reveals an ill-appearing female with shortness of breath. This is an 80-year-old female with a history upon referred process who presents with shortness of breath over the last day or so. She denies any chest pain. She more for oxygen. She is very short of breath on exertion. Exam: Decreased breath sounds bilaterally with expiratory wheezes. Disposition: The patient's workup showed some palmar fibrosis. She is very short of breath on exertion to the bathroom. She has increased oxygen use. She has received several albuterol treatments and steroids. We are recommending admission for further care.     Results for orders placed or performed during the hospital encounter of 04/18/19   CBC Auto Differential   Result Value Ref Range    WBC 3.4 (L) 4.0 - 11.0 K/uL    RBC 3.37 (L) 4.00 - 5.20 M/uL    Hemoglobin 10.8 (L) 12.0 - 16.0 g/dL    Hematocrit 34.2 (L) 36.0 - 48.0 %    .6 (H) 80.0 - 100.0 fL    MCH 31.9 26.0 - 34.0 pg    MCHC 31.4 31.0 - 36.0 g/dL    RDW 18.2 (H) 12.4 - 15.4 %    Platelets 085 (L) 720 - 450 K/uL    MPV 10.0 5.0 - 10.5 fL    Neutrophils % 79.4 %    Lymphocytes % 13.5 %    Monocytes % 5.9 %    Eosinophils % 0.9 %    Basophils % 0.3 %    Neutrophils # 2.7 1.7 - 7.7 K/uL    Lymphocytes # 0.5 (L) 1.0 - 5.1 K/uL    Monocytes # 0.2 0.0 - 1.3 K/uL    Eosinophils # 0.0 0.0 - 0.6 K/uL    Basophils # 0.0 0.0 - 0.2 K/uL   Comprehensive Metabolic Panel   Result Value Ref Range    Sodium 138 136 - 145 mmol/L    Potassium 5.0 3.5 - 5.1 mmol/L    Chloride 103 99 - 110 mmol/L    CO2 25 21 - 32 mmol/L    Anion Gap 10 3 - 16    Glucose 125 (H) 70 - 99 mg/dL    BUN 22 (H) 7 - 20 mg/dL    CREATININE 0.8 0.6 - 1.2 mg/dL    GFR Non-African American >60 >60    GFR African American >60 >60    Calcium 8.4 8.3 - 10.6 RBC, UA 1 0 - 4 /HPF    Epi Cells 1 0 - 5 /HPF   EKG 12 Lead   Result Value Ref Range    Ventricular Rate 124 BPM    Atrial Rate 124 BPM    P-R Interval 168 ms    QRS Duration 114 ms    Q-T Interval 330 ms    QTc Calculation (Bazett) 474 ms    P Axis 62 degrees    R Axis 110 degrees    T Axis -37 degrees    Diagnosis       Sinus tachycardiaIncomplete right bundle branch blockRight ventricular hypertrophyInferior infarct , age undeterminedST & T wave abnormality, consider anterolateral ischemia     Xr Chest Portable    Result Date: 4/18/2019  EXAMINATION: SINGLE XRAY VIEW OF THE CHEST 4/18/2019 6:09 pm COMPARISON: Images from chest CT scan 01/21/2018 HISTORY: ORDERING SYSTEM PROVIDED HISTORY: SOB TECHNOLOGIST PROVIDED HISTORY: Reason for exam:->SOB Ordering Physician Provided Reason for Exam: SOB Acuity: Acute Type of Exam: Initial FINDINGS: Mediastinal silhouette appears prominent but stable. Pulmonary vascular markings are prominent. No focal consolidation is appreciated. Chronic appearing reticular lung markings are present bilaterally similar to  image from 11/21/2018. Previously described chronic increased lung markings suggest pulmonary fibrosis. There is no focal consolidation appreciated. The chronic changes could obscure subtle airspace disease.              Иван Kebede MD  04/18/19 2019       Иван Kebede MD  04/18/19 9228       Иван Kebede MD  04/18/19 0468

## 2019-04-19 NOTE — CONSULTS
PRN  enoxaparin (LOVENOX) injection 40 mg, 40 mg, Subcutaneous, Daily    Allergies   Allergen Reactions    Pcn [Penicillins]        Social History:    TOBACCO:   reports that she has never smoked. She has never used smokeless tobacco.  ETOH:   reports that she does not drink alcohol. Patient currently lives independently  Environmental/chemical exposure: None known     Family History:       Problem Relation Age of Onset   [facility] Diabetes Mother     Coronary Art Dis Mother         young onset   [facility] Stroke Mother     COPD Brother         pulmonary fibrosis    Prostate Cancer Brother     COPD Brother     Cancer Brother         liver, skin    Stroke Daughter      REVIEW OF SYSTEMS:    CONSTITUTIONAL:  negative for fevers, chills, diaphoresis, activity change, appetite change, fatigue, night sweats and unexpected weight change.    EYES:  negative for blurred vision, eye discharge, visual disturbance and icterus  HEENT:  negative for hearing loss, tinnitus, ear drainage, sinus pressure, nasal congestion, epistaxis and snoring  RESPIRATORY:  See HPI  CARDIOVASCULAR:  negative for chest pain, palpitations, exertional chest pressure/discomfort, edema, syncope  GASTROINTESTINAL:  negative for nausea, vomiting, diarrhea, constipation, blood in stool and abdominal pain  GENITOURINARY:  negative for frequency, dysuria, urinary incontinence, decreased urine volume, and hematuria  HEMATOLOGIC/LYMPHATIC:  negative for easy bruising, bleeding and lymphadenopathy  ALLERGIC/IMMUNOLOGIC:  negative for recurrent infections, angioedema, anaphylaxis and drug reactions  ENDOCRINE:  negative for weight changes and diabetic symptoms including polyuria, polydipsia and polyphagia  MUSCULOSKELETAL:  negative for  pain, joint swelling, decreased range of motion and muscle weakness  NEUROLOGICAL:  negative for headaches, slurred speech, unilateral weakness  PSYCHIATRIC/BEHAVIORAL: negative for hallucinations, behavioral problems, confusion and agitation. Objective:   PHYSICAL EXAM:      VITALS:  /79   Pulse 75   Temp 97.5 °F (36.4 °C) (Temporal)   Resp 16   Ht 5' 3\" (1.6 m)   Wt 180 lb (81.6 kg)   SpO2 99%   BMI 31.89 kg/m²      24HR INTAKE/OUTPUT:      Intake/Output Summary (Last 24 hours) at 4/19/2019 1151  Last data filed at 4/19/2019 0738  Gross per 24 hour   Intake --   Output 1700 ml   Net -1700 ml     CONSTITUTIONAL:  awake, alert, cooperative, no apparent distress, and appears stated age  NECK:  Supple, symmetrical, trachea midline, no adenopathy, thyroid symmetric, not enlarged and no tenderness, skin normal  LUNGS:  no increased work of breathing and clear to auscultation. No accessory muscle use  CARDIOVASCULAR: S1 and S2, no edema and no JVD  ABDOMEN:  normal bowel sounds, non-distended and no masses palpated, and no tenderness to palpation. No hepatospleenomegaly  LYMPHADENOPATHY:  no axillary or supraclavicular adenopathy. No cervical adnenopathy  PSYCHIATRIC: Oriented to person place and time. No obvious depression or anxiety. MUSCULOSKELETAL: No obvious misalignment or effusion of the joints. No clubbing, cyanosis of the digits. SKIN:  normal skin color, texture, turgor and no redness, warmth, or swelling. No palpable nodules    DATA:    Old records have been reviewed    CBC:  Recent Labs     04/18/19  1838   WBC 3.4*   RBC 3.37*   HGB 10.8*   HCT 34.2*   *   .6*   MCH 31.9   MCHC 31.4   RDW 18.2*      BMP:  Recent Labs     04/18/19  1838      K 5.0      CO2 25   BUN 22*   CREATININE 0.8   CALCIUM 8.4   GLUCOSE 125*      ABG:  No results for input(s): PHART, LCC7IYD, PO2ART, RQC9BGD, N5QJSUDQ, BEART in the last 72 hours. No results found for: BNP  Lab Results   Component Value Date    TROPONINI <0.01 04/18/2019       Cultures:     Abx:    Radiology Review:  All pertinent images / reports were reviewed as a part of this visit. Assessment:     1.   Acute on chronic hypoxemic respiratory

## 2019-04-19 NOTE — PROGRESS NOTES
Occupational Therapy   Occupational Therapy Initial Assessment  Date: 2019   Patient Name: Jada Mckinlye  MRN: [id number]     : 1935    Date of Service: 2019    Discharge Recommendations:  Jada Mckinley scored a 20/24 on the AM-PAC ADL Inpatient form. Current research shows that an AM-PAC score of 18 or greater is typically associated with a discharge to the patient's home setting. Based on the patients AM-PAC score and their current ADL deficits, it is recommended that the patient have 2-3 sessions per week of Occupational Therapy at d/c to increase the patients independence. HOME HEALTH CARE: LEVEL 3 SAFETY     - Initial home health evaluation to occur within 24-48 hours, in patient home   - Therapy evaluations in home within 24-48 hours of discharge; including DME and home safety   - Frontload therapy 5 days, then 3x a week   - Therapy to evaluate if patient has [address] needs for personal care   -  evaluation within 24-48 hours, includes evaluation of resources and insurance to determine [place], [place], LTC, and Medicaid options      OT Equipment Recommendations  Equipment Needed: No    Assessment   Performance deficits / Impairments: Decreased functional mobility ; Decreased endurance;Decreased ADL status  Assessment: Pt is not at her baseline level of occupational function, based on the above deficits associated with pulmonary fibrosis. Pt would benefit from continued skilled acute OT services to address these deficits. Treatment Diagnosis: Decreased ADL status, endurance and functional mobility associated with pulmonary fibrosis  Prognosis: Good  Decision Making: Low Complexity  History: Pt 79 yo, lives w/dtr, independent ADLs, gets assist w/IADLs, ambulates independently, doesn't drive. No falls.  PMH: colon Ca, macular degeneration, DJD, depression  Exam: ROM, MMT, 6 clicks, 3 performance deficits/impairments, stable presentation  Assistance / Modification: CGA for functional mobilty/transfers, standing ADLs  Patient Education: OT eval, POC, d/c recommendation, safe functional transfers using grab bars, pursed lip breathing  Barriers to Learning: None  REQUIRES OT FOLLOW UP: Yes  Activity Tolerance  Activity Tolerance: Patient Tolerated treatment well  Safety Devices  Safety Devices in place: Yes  Type of devices: Patient at risk for falls; Left in chair;Call light within reach;Nurse notified;Gait belt; Chair alarm in place           Patient Diagnosis(es): The primary encounter diagnosis was Shortness of breath. Diagnoses of Hypoxia and History of pulmonary fibrosis were also pertinent to this visit. has a past medical history of Acute biliary pancreatitis without infection or necrosis, Bundle branch block, right, Colon cancer ([facility] [address] Depression, DJD (degenerative joint disease), Gallstones, GERD (gastroesophageal reflux disease), Macular degeneration, and Urinary incontinence, mixed. has a past surgical history that includes Bladder surgery (2000); Hysterectomy (2000); right colectomy (04/29/2018); Appendectomy (4/20/2108); and Cholecystectomy, open (04/20/2018). Treatment Diagnosis: Decreased ADL status, endurance and functional mobility associated with pulmonary fibrosis      Restrictions  Restrictions/Precautions  Restrictions/Precautions: Fall Risk(high)  Required Braces or Orthoses?: No  Position Activity Restriction  Other position/activity restrictions: Pt arrived at the ER 4/18 with increased SOB due to pulmonary fibrosis. Pt is independent in ADLs and gait at baseline and does not use AD in the home. Subjective   General  Chart Reviewed: Yes  Family / Caregiver Present: Yes(son and daughter upon arrival, left at beginning of session )  Diagnosis: Pulmonary fibrosis  Subjective  Subjective: Pt upright in bed upon arrival. Agreeable to therapy eval.  Pt with flat affect.      Social/Functional History  Social/Functional History  Lives With: Family(Daughter )  Type of Home: House  Home Layout: Two level  Home Access: Stairs to enter with rails(out back has a ramp, uses back door always)  Entrance Stairs - Number of Steps: 4  Bathroom Shower/Tub: Walk-in shower  Bathroom Toilet: Standard  Bathroom Equipment: Grab bars in shower, Shower chair, Hand-held shower  Home Equipment: [employer]  ADL Assistance: Independent  Homemaking Assistance: Needs assistance(assistance from all 11 children)  Homemaking Responsibilities: Yes(Pt does not do cleaning or laundry. )  Ambulation Assistance: Independent  Transfer Assistance: Independent  Active : No  Leisure & Hobbies: make quilts   Additional Comments: no falls in the last 6 months       Objective   Vision: Impaired(macular degeneration )  Hearing: Within functional limits    Orientation  Overall Orientation Status: Within Functional Limits  Observation/Palpation  Posture: Fair  Observation: No catheter in place  Balance  Sitting Balance: Contact guard assistance(dynamic EOB)  Standing Balance: Contact guard assistance  Standing Balance  Time: ~15 seconds x2   Activity: functional mobility to/from bathroom  Functional Mobility  Functional - Mobility Device: No device  Activity: To/from bathroom  Assist Level: Contact guard assistance  Functional Mobility Comments: Seated EOB O2 was 92%> from EOB to toilet O2 sat was 87%, pt required VC for pursed lip breathing, up to 92% >from toilet to recliner O2 sat was 86%, up to 93% with VC for pursed lip breathing. Toilet Transfers  Toilet - Technique: Ambulating  Equipment Used: Standard toilet  Toilet Transfer: Contact guard assistance  Toilet Transfers Comments: Pt required VC for grab bars. ADL  LE Dressing: Contact guard assistance;Setup(doff/don socks)  Additional Comments: Pt declined other ADLs this date.    Tone RUE  RUE Tone: Normotonic  Tone LUE  LUE Tone: Normotonic  Coordination  Movements Are Fluid And Coordinated: Yes     Bed mobility  Supine to Sit: Stand by assistance  Scooting: Stand by assistance  Comment: Ended session with pt in recliner. Transfers  Stand Step Transfers: Contact guard assistance  Sit to stand: Contact guard assistance  Stand to sit: Contact guard assistance  Vision - Basic Assessment  Visual History: Macular degeneration  Patient Visual Report: No visual complaint reported.   Cognition  Overall Cognitive Status: WFL  Perception  Overall Perceptual Status: WFL     Sensation  Overall Sensation Status: WFL        LUE AROM (degrees)  LUE AROM : WFL  Left Hand AROM (degrees)  Left Hand AROM: WFL  RUE AROM (degrees)  RUE AROM : WFL  Right Hand AROM (degrees)  Right Hand AROM: WFL  LUE Strength  Gross LUE Strength: WFL(shoulder: 3/5, elbow 4-/5)  L Hand Grasp: 4/5  RUE Strength  Gross RUE Strength: WFL(shoulder: 4-/5, elbow 4-/5)  R Hand Grasp: 4/5                   Plan   Plan  Times per week: 3-5  Times per day: Daily  Current Treatment Recommendations: Safety Education & Training, Self-Care / ADL, Home Management Training, Endurance Training, Functional Mobility Training      -[facility] Score        -[facility] Inpatient Daily Activity Raw Score: 20  AM-PAC Inpatient ADL T-Scale Score : 42.03  ADL Inpatient CMS 0-100% Score: 38.32  ADL Inpatient CMS G-Code Modifier : CJ    Goals  Short term goals  Time Frame for Short term goals: Discharge   Short term goal 1: mod I UB/LB dressing demonstrating energy conservation techniques  Short term goal 2: mod I functional mobility for ADL activity  Short term goal 3: mod I transfer to ADL surfaces  Short term goal 4: Pt to tolerate standing 3-5 minutes for ADL activity   Short term goal 5: mod I UB/LB bathing demonstrating energy conservation techniques       Therapy Time   Individual Concurrent Group Co-treatment   Time In 1309         Time Out 1336         Minutes 27               Timed Code Treatment Minutes:   12    Total Treatment Minutes: [address] [facility], [facility] [address], OTR/L, [id number]

## 2019-04-19 NOTE — PLAN OF CARE
Problem: Falls - Risk of:  Goal: Will remain free from falls  Description  Will remain free from falls  4/19/2019 1004 by Jose Chavez RN  Outcome: Ongoing  Note:   Pt alert and oriented, call light and belongings within reach, bed in lowest position with wheels locked and alarm on, yellow blanket and fall risk wristband in place, nonskid footwear on. Instructed to call out for assistance. Verbalized understanding. Will continue to monitor.    4/19/2019 0144 by Kamila Rangel RN  Outcome: Ongoing

## 2019-04-19 NOTE — PROGRESS NOTES
Physical Therapy    Facility/Department: [address]  Initial Assessment    NAME: Miac Cain  : 1935  MRN: [id number]    Date of Service: 2019    Discharge Recommendations: Mica Cain scored a 17/24 on the AM-PAC short mobility form. Current research shows that an AM-PAC score of 18 or greater is typically associated with a discharge to the patient's home setting. Due to home modifications and support of family, patient is a good candidate for return to home with home health services. Based on the patients AM-PAC score and their current functional mobility deficits, it is recommended that the patient have 2-3 sessions per week of Physical Therapy at d/c to increase the patients independence. HOME HEALTH CARE: LEVEL 3 SAFETY     - Initial home health evaluation to occur within 24-48 hours, in patient home   - Therapy evaluations in home within 24-48 hours of discharge; including DME and home safety   - Frontload therapy 5 days, then 3x a week   - Therapy to evaluate if patient has [address] needs for personal care   -  evaluation within 24-48 hours, includes evaluation of resources and insurance to determine [place], [place], LTC, and Medicaid options         PT Equipment Recommendations  Equipment Needed: Yes  Mobility Devices: Radha Sterling: Rollator [id number])  Other: Pt could benefit from rollator walker in order to increase participation in the community. Pt presents with decreased functional mobility and endurance and requires frequent seated rest breaks. Assessment   Body structures, Functions, Activity limitations: Decreased functional mobility ; Decreased endurance;Decreased vision/visual deficit; Decreased balance; Increased Pain  Assessment: Patient presents with the above deficits and has decreased activity tolerance from baseline.  Pt could benefit from skilled PT in order to restore functional mobility, increase endurance, return to prior setting, and level tile  Device: No Device  Assistance: Contact guard assistance  Quality of Gait: Decreased alysia, stride length. Unsteady. No LOB. Distance: 12 ft + 12 ft   Comments: Walked from EOB to bathroom. SpO2 at EOB 92%, dropped to 87% following ambulation, increased to 92% with cues on PLB after about one minute. Dropped to 88% going from toilet to chair and again spencer to low 90s with PLB. Balance  Posture: Fair  Sitting - Static: Good  Sitting - Dynamic: Good;-(Pt sat EOB for >3 min. Able to don socks without UE support but with min sway. )  Standing - Static: Fair;+  Standing - Dynamic: Fair;+(min sway with CGA)        Plan   Plan  Times per week: 3-5x  Times per day: Daily  Specific instructions for Next Treatment: Introduce rollator walker   Current Treatment Recommendations: Transfer Training, Endurance Training, Balance Training, Gait Training, Functional Mobility Training, Stair training  Safety Devices  Type of devices: Gait belt, Nurse notified, Chair alarm in place, Call light within reach, All fall risk precautions in place, Patient at risk for falls, Left in chair(Family re-entering room at end of session)  Restraints  Initially in place: No      AM-PAC Score  AM-PAC Inpatient Mobility Raw Score : 17  AM-PAC Inpatient T-Scale Score : 42.13  Mobility Inpatient CMS 0-100% Score: 50.57  Mobility Inpatient CMS G-Code Modifier : CK          Goals  Short term goals  Time Frame for Short term goals: By D/C  Short term goal 1: Pt to complete bed mobility with modified independence   Short term goal 2: Pt to ambulate 50ft with LRAD and supervision   Short term goal 3: Pt to complete sit<>stand with supervision while maintaining SpO2 in the 90s  Long term goals  Time Frame for Long term goals : LTGs=STGs  Patient Goals   Patient goals :  To remain independent        Therapy Time   Individual Concurrent Group Co-treatment   Time In 1308         Time Out 1336         Minutes 28         Timed minutes: 13  Total minutes: 1002 [facility], SPT   Kailash Cabral, PT      I agree with the above note. PT directly observed the SPT with the patient.   Charlotte Panchal, [address] DPT [id number]

## 2019-04-19 NOTE — PROGRESS NOTES
Patient complaining that she has to urinate but in able to do so in the bed and that it is painful at this time. This RN assisted the patient to the bedside commode, O2 levels stayed above 92%on 4 liters and patient was able to void 400ml.  Fall precautions in place, hourly rounding, call light and belongings in reach, bed in lowest position, wheels locked in place, side rails up x 2, walkways free of clutter

## 2019-04-19 NOTE — PLAN OF CARE
Problem: Falls - Risk of:  Goal: Will remain free from falls  Description  Will remain free from falls  Outcome: Ongoing   Fall precautions in place, hourly rounding, call light and belongings in reach, bed in lowest position, wheels locked in place, side rails up x 2, walkways free of clutter

## 2019-04-19 NOTE — CARE COORDINATION
Rashi You at [facility] to refer pt, left voicemail and Robert Rosas at [place] for a rollator, left voicemail. Will continue to follow for support and discharge planning.  Cortes Haley, MSW, LSW

## 2019-04-19 NOTE — PROGRESS NOTES
Discussed the importance of shifting positions in the bed to prevent bed sores. Verbalized understanding.

## 2019-04-19 NOTE — ED NOTES
Report given to Beth Rolle RN over phone. Pt alert and oriented and shows no signs of distress at time of transfer to [address] [place] [address]. Pt taken to room by ED Tech in bed. Pt on tele at time of transfer.        María Elena Casiano RN  04/18/19 1078

## 2019-04-19 NOTE — PROGRESS NOTES
Admission completed with patient and her family at the bedside. Patient is here for SOB, she is on 3 liters of oxygen via nasal cannula at home, is now on 4 liters here. Discussed with patient and family removing the mckeon catheter in exchange for an external female catheter.  Fall precautions in place, hourly rounding, call light and belongings in reach, bed in lowest position, wheels locked in place, side rails up x 2, walkways free of clutter

## 2019-04-19 NOTE — PROGRESS NOTES
[facility] PROGRESS NOTE    4/19/2019 10:34 AM        Name: Kelvin Garzon . Admitted: 4/18/2019  Primary Care Provider: Pino Monsalve MD (Tel: [phone])      Subjective: Daria Rios She is sitting in bed she feels better  Still have SOSA and her O2 sat drops significantly with activity      Reviewed interval ancillary notes    Current Medications    melatonin tablet 10.5 mg Nightly PRN   multivitamin 1 tablet Daily   oxybutynin (DITROPAN-XL) extended release tablet 10 mg Daily   rOPINIRole (REQUIP) tablet 2 mg QPM   acetaminophen (TYLENOL) tablet 500 mg Nightly PRN   And    diphenhydrAMINE (BENADRYL) tablet 25 mg Nightly PRN   sodium chloride flush 0.9 % injection 10 mL 2 times per day   sodium chloride flush 0.9 % injection 10 mL PRN   ondansetron (ZOFRAN) injection 4 mg Q6H PRN   enoxaparin (LOVENOX) injection 40 mg Daily       Objective:  /79   Pulse 75   Temp 97.5 °F (36.4 °C) (Temporal)   Resp 16   Ht 5' 3\" (1.6 m)   Wt 180 lb (81.6 kg)   SpO2 99%   BMI 31.89 kg/m²     Intake/Output Summary (Last 24 hours) at 4/19/2019 1034  Last data filed at 4/19/2019 0738  Gross per 24 hour   Intake --   Output 1700 ml   Net -1700 ml    Wt Readings from Last 3 Encounters:   04/19/19 180 lb (81.6 kg)   03/27/19 166 lb (75.3 kg)   01/07/19 169 lb (76.7 kg)       General appearance:  Appears comfortable  Eyes: Sclera clear. Pupils equal.  ENT: Moist oral mucosa. Trachea midline, no adenopathy. Cardiovascular: Regular rhythm, normal S1, S2. No murmur. No edema in lower extremities  Respiratory: Not using accessory muscles. Good inspiratory effort. Clear to auscultation bilaterally, no wheeze or crackles. GI: Abdomen soft, no tenderness, not distended, normal bowel sounds  Musculoskeletal: No cyanosis in digits, neck supple  Neurology: CN 2-12 grossly intact. No speech or motor deficits  Psych: Normal affect. Alert and oriented in time, place and person  Skin: Warm, dry, normal turgor    Labs and Tests:  CBC:   Recent Labs     04/18/19 1838   WBC 3.4*   HGB 10.8*   *     BMP:  Recent Labs     04/18/19 1838      K 5.0      CO2 25   BUN 22*   CREATININE 0.8   GLUCOSE 125*     Hepatic: Recent Labs     04/18/19 1838   AST 48*   ALT 33   BILITOT 0.4   ALKPHOS 108         Problem List  Principal Problem:    Acute respiratory failure with hypoxia (HCC)  Active Problems:    Pulmonary fibrosis (HCC)  Resolved Problems:    * No resolved hospital problems. *       Assessment & Plan:   Pulmonary Fibrosis -  UIP  - CT evidence of honeycomb fibrosis, traction bronchiectasis and heterogeneous distribution of fibrosis. 11/18 CT stable  - currently on 3L but desats with any exertion  - O2 prn keep SaO2 92%  - I doubt there is any infection, pro-calcitonin is within normal  - Started on Solu-Medrol  - Respiratory film by PCR pending results. - Pulmonology appreciate recommendations     Acute respiratory failure/Hypoxia/Shortness of Breath - due to above; provide supplemental oxygen as necessary to keep SaO2 92% or greater. Bilateral basal crackles, not sure if it is secondary to fibrosis versus trace IVF,? Lasix.       Diet: DIET GENERAL;  Code:Full Code  DVT PPX lovenox      Akilah Gomez MD   4/19/2019 10:34 AM

## 2019-04-19 NOTE — ED NOTES
Sutton catheter placed d/t pt SpO2 dropping with movement per Jose Samano MD. Sourav Bound to use bedpan d/t hypoxia.       Ashanti Rosen RN  04/18/19 0636

## 2019-04-20 LAB
ALBUMIN SERPL-MCNC: 3.3 G/DL (ref 3.4–5)
ANION GAP SERPL CALCULATED.3IONS-SCNC: 12 MMOL/L (ref 3–16)
BASOPHILS ABSOLUTE: 0 K/UL (ref 0–0.2)
BASOPHILS RELATIVE PERCENT: 0.2 %
BUN BLDV-MCNC: 28 MG/DL (ref 7–20)
CALCIUM SERPL-MCNC: 9 MG/DL (ref 8.3–10.6)
CHLORIDE BLD-SCNC: 100 MMOL/L (ref 99–110)
CO2: 26 MMOL/L (ref 21–32)
CREAT SERPL-MCNC: 1 MG/DL (ref 0.6–1.2)
EOSINOPHILS ABSOLUTE: 0 K/UL (ref 0–0.6)
EOSINOPHILS RELATIVE PERCENT: 0 %
GFR AFRICAN AMERICAN: >60
GFR NON-AFRICAN AMERICAN: 53
GLUCOSE BLD-MCNC: 147 MG/DL (ref 70–99)
HCT VFR BLD CALC: 36.6 % (ref 36–48)
HEMOGLOBIN: 11.5 G/DL (ref 12–16)
LYMPHOCYTES ABSOLUTE: 0.4 K/UL (ref 1–5.1)
LYMPHOCYTES RELATIVE PERCENT: 20 %
MAGNESIUM: 2.2 MG/DL (ref 1.8–2.4)
MCH RBC QN AUTO: 32.2 PG (ref 26–34)
MCHC RBC AUTO-ENTMCNC: 31.5 G/DL (ref 31–36)
MCV RBC AUTO: 102.1 FL (ref 80–100)
MONOCYTES ABSOLUTE: 0.1 K/UL (ref 0–1.3)
MONOCYTES RELATIVE PERCENT: 5 %
NEUTROPHILS ABSOLUTE: 1.6 K/UL (ref 1.7–7.7)
NEUTROPHILS RELATIVE PERCENT: 74.8 %
ORGANISM: ABNORMAL
PDW BLD-RTO: 18.5 % (ref 12.4–15.4)
PHOSPHORUS: 4.8 MG/DL (ref 2.5–4.9)
PLATELET # BLD: 137 K/UL (ref 135–450)
PMV BLD AUTO: 10.1 FL (ref 5–10.5)
POTASSIUM SERPL-SCNC: 4.4 MMOL/L (ref 3.5–5.1)
RBC # BLD: 3.59 M/UL (ref 4–5.2)
SODIUM BLD-SCNC: 138 MMOL/L (ref 136–145)
URINE CULTURE, ROUTINE: ABNORMAL
URINE CULTURE, ROUTINE: ABNORMAL
WBC # BLD: 2.1 K/UL (ref 4–11)

## 2019-04-20 PROCEDURE — 6370000000 HC RX 637 (ALT 250 FOR IP): Performed by: INTERNAL MEDICINE

## 2019-04-20 PROCEDURE — 6360000002 HC RX W HCPCS: Performed by: INTERNAL MEDICINE

## 2019-04-20 PROCEDURE — 99223 1ST HOSP IP/OBS HIGH 75: CPT | Performed by: INTERNAL MEDICINE

## 2019-04-20 PROCEDURE — 80069 RENAL FUNCTION PANEL: CPT

## 2019-04-20 PROCEDURE — 99233 SBSQ HOSP IP/OBS HIGH 50: CPT | Performed by: INTERNAL MEDICINE

## 2019-04-20 PROCEDURE — 85025 COMPLETE CBC W/AUTO DIFF WBC: CPT

## 2019-04-20 PROCEDURE — 6370000000 HC RX 637 (ALT 250 FOR IP): Performed by: NURSE PRACTITIONER

## 2019-04-20 PROCEDURE — 83735 ASSAY OF MAGNESIUM: CPT

## 2019-04-20 PROCEDURE — 1200000000 HC SEMI PRIVATE

## 2019-04-20 PROCEDURE — 2700000000 HC OXYGEN THERAPY PER DAY

## 2019-04-20 PROCEDURE — 36415 COLL VENOUS BLD VENIPUNCTURE: CPT

## 2019-04-20 PROCEDURE — 2580000003 HC RX 258: Performed by: INTERNAL MEDICINE

## 2019-04-20 PROCEDURE — 94640 AIRWAY INHALATION TREATMENT: CPT

## 2019-04-20 PROCEDURE — 94760 N-INVAS EAR/PLS OXIMETRY 1: CPT

## 2019-04-20 RX ORDER — BENZONATATE 100 MG/1
100 CAPSULE ORAL 3 TIMES DAILY PRN
Status: DISCONTINUED | OUTPATIENT
Start: 2019-04-20 | End: 2019-04-22 | Stop reason: HOSPADM

## 2019-04-20 RX ORDER — METHYLPREDNISOLONE SODIUM SUCCINATE 40 MG/ML
40 INJECTION, POWDER, LYOPHILIZED, FOR SOLUTION INTRAMUSCULAR; INTRAVENOUS EVERY 12 HOURS
Status: DISCONTINUED | OUTPATIENT
Start: 2019-04-20 | End: 2019-04-21

## 2019-04-20 RX ADMIN — ENOXAPARIN SODIUM 40 MG: 40 INJECTION SUBCUTANEOUS at 09:07

## 2019-04-20 RX ADMIN — Medication 10 ML: at 05:28

## 2019-04-20 RX ADMIN — Medication 10 ML: at 00:26

## 2019-04-20 RX ADMIN — METHYLPREDNISOLONE SODIUM SUCCINATE 40 MG: 40 INJECTION, POWDER, FOR SOLUTION INTRAMUSCULAR; INTRAVENOUS at 21:29

## 2019-04-20 RX ADMIN — Medication 10 ML: at 21:28

## 2019-04-20 RX ADMIN — IPRATROPIUM BROMIDE AND ALBUTEROL SULFATE 1 AMPULE: .5; 3 SOLUTION RESPIRATORY (INHALATION) at 07:53

## 2019-04-20 RX ADMIN — METHYLPREDNISOLONE SODIUM SUCCINATE 40 MG: 40 INJECTION, POWDER, FOR SOLUTION INTRAMUSCULAR; INTRAVENOUS at 05:28

## 2019-04-20 RX ADMIN — IPRATROPIUM BROMIDE AND ALBUTEROL SULFATE 1 AMPULE: .5; 3 SOLUTION RESPIRATORY (INHALATION) at 15:11

## 2019-04-20 RX ADMIN — ROPINIROLE HYDROCHLORIDE 2 MG: 1 TABLET, FILM COATED ORAL at 21:29

## 2019-04-20 RX ADMIN — OXYBUTYNIN CHLORIDE 10 MG: 5 TABLET, EXTENDED RELEASE ORAL at 09:07

## 2019-04-20 RX ADMIN — Medication 10 ML: at 09:07

## 2019-04-20 RX ADMIN — METHYLPREDNISOLONE SODIUM SUCCINATE 40 MG: 40 INJECTION, POWDER, FOR SOLUTION INTRAMUSCULAR; INTRAVENOUS at 00:26

## 2019-04-20 RX ADMIN — IPRATROPIUM BROMIDE AND ALBUTEROL SULFATE 1 AMPULE: .5; 3 SOLUTION RESPIRATORY (INHALATION) at 22:10

## 2019-04-20 RX ADMIN — THERA TABS 1 TABLET: TAB at 09:07

## 2019-04-20 RX ADMIN — IPRATROPIUM BROMIDE AND ALBUTEROL SULFATE 1 AMPULE: .5; 3 SOLUTION RESPIRATORY (INHALATION) at 11:49

## 2019-04-20 ASSESSMENT — ENCOUNTER SYMPTOMS
COLOR CHANGE: 0
DIARRHEA: 0
EYE REDNESS: 0
EYE DISCHARGE: 0
NAUSEA: 0
STRIDOR: 0
CHOKING: 0
ABDOMINAL PAIN: 0
CHEST TIGHTNESS: 0
PHOTOPHOBIA: 0
TROUBLE SWALLOWING: 0
SHORTNESS OF BREATH: 0
RHINORRHEA: 0
COUGH: 0
APNEA: 0
BLOOD IN STOOL: 0
FACIAL SWELLING: 0

## 2019-04-20 ASSESSMENT — PAIN SCALES - GENERAL
PAINLEVEL_OUTOF10: 0

## 2019-04-20 NOTE — PROGRESS NOTES
Pulmonary & Critical Care Inpatient Progress Note   Nanda Looney MD     REASON FOR TODAY'S VISIT:  Acute on chronic resp failure    SUBJECTIVE:   On 3 LPM   Dyspneic with exertion   Denies congestion, sputum production    Scheduled Meds:   methylPREDNISolone  40 mg Intravenous Q12H    multivitamin  1 tablet Oral Daily    oxybutynin  10 mg Oral Daily    rOPINIRole  2 mg Oral QPM    ipratropium-albuterol  1 ampule Inhalation Q4H WA    sodium chloride flush  10 mL Intravenous 2 times per day    enoxaparin  40 mg Subcutaneous Daily       Continuous Infusions:      PRN Meds:  melatonin, acetaminophen **AND** diphenhydrAMINE, sodium chloride flush, ondansetron    ALLERGIES:  Patient is allergic to pcn [penicillins]. Objective:   PHYSICAL EXAM:  /69   Pulse 101   Temp 98 °F (36.7 °C) (Temporal)   Resp 16   Ht 5' 3\" (1.6 m)   Wt 183 lb 6.4 oz (83.2 kg)   SpO2 95%   BMI 32.49 kg/m²    Physical Exam   Constitutional: She appears well-developed and well-nourished. No distress. HENT:   Head: Normocephalic and atraumatic. Mouth/Throat: Oropharynx is clear and moist. No oropharyngeal exudate. Eyes: Pupils are equal, round, and reactive to light. EOM are normal.   Neck: Neck supple. No JVD present. Cardiovascular: Normal heart sounds. Exam reveals no gallop and no friction rub. No murmur heard. Pulmonary/Chest: Effort normal. She has no wheezes. She has rales. Equal chest rise and expansion bilaterally   Abdominal: Soft. Bowel sounds are normal. She exhibits no distension. There is no tenderness. Musculoskeletal: Normal range of motion. She exhibits no edema. Lymphadenopathy:     She has no cervical adenopathy. Neurological: She is alert. No cranial nerve deficit. CN 2-12 grossly intact   Skin: Skin is warm and dry. No rash noted. She is not diaphoretic.           Data Reviewed:   LABS:  CBC:  Recent Labs     04/18/19  1838 04/20/19  0539   WBC 3.4* 2.1*   HGB 10.8* 11.5*   HCT 34.2* 36.6

## 2019-04-20 NOTE — PROGRESS NOTES
[facility] PROGRESS NOTE    4/20/2019 3:33 PM        Name: Jada Mckinley . Admitted: 4/18/2019  Primary Care Provider: Javid Dickson MD (Tel: [phone])      Subjective:  . Patient is sitting in bed  Family at the bedside  Patient denies any chest pain  Patient states that her breathing is the same while ambulating          Reviewed interval ancillary notes    Current Medications    methylPREDNISolone sodium (SOLU-MEDROL) injection 40 mg Q12H   benzonatate (TESSALON) capsule 100 mg TID PRN   melatonin tablet 10.5 mg Nightly PRN   multivitamin 1 tablet Daily   oxybutynin (DITROPAN-XL) extended release tablet 10 mg Daily   rOPINIRole (REQUIP) tablet 2 mg QPM   acetaminophen (TYLENOL) tablet 500 mg Nightly PRN   And    diphenhydrAMINE (BENADRYL) tablet 25 mg Nightly PRN   ipratropium-albuterol (DUONEB) nebulizer solution 1 ampule Q4H WA   sodium chloride flush 0.9 % injection 10 mL 2 times per day   sodium chloride flush 0.9 % injection 10 mL PRN   ondansetron (ZOFRAN) injection 4 mg Q6H PRN   enoxaparin (LOVENOX) injection 40 mg Daily       Objective:  /64   Pulse 95   Temp 97 °F (36.1 °C) (Temporal)   Resp 16   Ht 5' 3\" (1.6 m)   Wt 183 lb 6.4 oz (83.2 kg)   SpO2 95%   BMI 32.49 kg/m²     Intake/Output Summary (Last 24 hours) at 4/20/2019 1533  Last data filed at 4/20/2019 0756  Gross per 24 hour   Intake 20 ml   Output 251 ml   Net -231 ml      Wt Readings from Last 3 Encounters:   04/20/19 183 lb 6.4 oz (83.2 kg)   03/27/19 166 lb (75.3 kg)   01/07/19 169 lb (76.7 kg)       General appearance:  Appears comfortable  Eyes: Sclera clear. Pupils equal.  ENT: Moist oral mucosa. Trachea midline, no adenopathy. Cardiovascular: Regular rhythm, normal S1, S2. No murmur. No edema in lower extremities  Respiratory: Not using accessory muscles. Good inspiratory effort.  Clear to auscultation bilaterally, no wheeze or crackles. GI: Abdomen soft, no tenderness, not distended, normal bowel sounds  Musculoskeletal: No cyanosis in digits, neck supple  Neurology: CN 2-12 grossly intact. No speech or motor deficits  Psych: Normal affect. Alert and oriented in time, place and person  Skin: Warm, dry, normal turgor    Labs and Tests:  CBC:   Recent Labs     04/18/19 1838 04/20/19  0539   WBC 3.4* 2.1*   HGB 10.8* 11.5*   * 137     BMP:    Recent Labs     04/18/19 1838 04/20/19  0539    138   K 5.0 4.4    100   CO2 25 26   BUN 22* 28*   CREATININE 0.8 1.0   GLUCOSE 125* 147*     Hepatic:   Recent Labs     04/18/19 1838   AST 48*   ALT 33   BILITOT 0.4   ALKPHOS 108         Problem List  Principal Problem:    Acute respiratory failure with hypoxia (HCC)  Active Problems:    Acute on chronic respiratory failure with hypoxia (HCC)    Pulmonary fibrosis (HCC)    Shortness of breath    UTI due to Klebsiella species    MDRO (multiple drug resistant organisms) resistance    Leukopenia    History of colon cancer    H/O: depression    Macrocytic anemia    Class 1 obesity due to excess calories with body mass index (BMI) of 32.0 to 32.9 in adult  Resolved Problems:    * No resolved hospital problems. *       Assessment & Plan:   Pulmonary Fibrosis -  UIP  - CT evidence of honeycomb fibrosis, traction bronchiectasis and heterogeneous distribution of fibrosis. 11/18 CT stable  - currently on 3L but desats with any exertion  - O2 prn keep SaO2 92%  - I doubt there is any infection, pro-calcitonin is within normal  - Started on Solu-Medrol  - Respiratory film by PCR pending results. - Pulmonology appreciate recommendations     Acute respiratory failure/Hypoxia/Shortness of Breath - due to above; provide supplemental oxygen as necessary to keep SaO2 92% or greater. Bilateral basal crackles, not sure if it is secondary to fibrosis versus trace IVF,? Lasix.     Urinalysis came back positive for Klebsiella more than 100,000 CFU and it was multi-drug-resistant organism, patient denies any symptoms at the present time, she denies any frequency, dysuria or suprapubic pressure, I'm not sure in the setting of her pulmonary fibrosis and increased work of breathing whether we should treat it or not, will ask ID for opinion.       Diet: DIET GENERAL;  Code:Full Code  DVT PPX lovenox      Irvin Kwok MD   4/20/2019 3:33 PM

## 2019-04-20 NOTE — PLAN OF CARE
Problem: Falls - Risk of:  Goal: Will remain free from falls  Description  Will remain free from falls  Outcome: Ongoing     Problem: Pain:  Goal: Pain level will decrease  Description  Pain level will decrease  Outcome: Ongoing  Goal: Control of acute pain  Description  Control of acute pain  Outcome: Ongoing

## 2019-04-20 NOTE — PROGRESS NOTES
Patient AOx4, VSS, assessment performed and documented. Medications administered as ordered with no complications. Plan of care discussed with patient, verbalized understanding. Call light within reach. No additional needs verbalized at this time. Will continue to monitor.

## 2019-04-20 NOTE — CONSULTS
Infectious Diseases   Consult Note        Admission Date: 4/18/2019  Hospital Day: Hospital Day: 3  Attending: Harshil Fraire MD  Date of service: 4/20/19    Presenting complaint:   Chief Complaint   Patient presents with    Shortness of Breath     Pt reports increased sob today, nasal drainage. Pt wears 3 liters of oxygen per cannula.          Reason for admission: Pulmonary fibrosis ([address] [J84.10]  Pulmonary fibrosis ([address] [J84.10]  Pulmonary fibrosis ([address] [J84.10]  Pulmonary fibrosis ([address] [J84.10]    Chief complaint/ Reason for consult: Multidrug resistant Klebsiella in the urine culture    Problem list:       Patient Active Problem List   Diagnosis Code    GERD (gastroesophageal reflux disease) K21.9    Primary osteoarthritis involving multiple joints M15.0    Macular degeneration H35.30    Psoriasis L40.9    Urinary incontinence, mixed N39.46    Osteoarthritis, knee M17.10    Restless legs syndrome G25.81    Chest rales R09.89    Chronic cough R05    UIP (usual interstitial pneumonitis) ([address] J84.112    Acute on chronic respiratory failure with hypoxia (HCC) J96.21    Malignant neoplasm of ascending colon ([address]  positive subserosal adipose, negative lymph nodes C18.2    Pulmonary fibrosis (HCC) J84.10    Acute respiratory failure with hypoxia (HCC) J96.01    Shortness of breath R06.02    UTI due to Klebsiella species N39.0, B96.1    MDRO (multiple drug resistant organisms) resistance Z16.35    Leukopenia D72.819    History of colon cancer Z85.038    H/O: depression Z86.59    Macrocytic anemia D53.9    Class 1 obesity due to excess calories with body mass index (BMI) of 32.0 to 32.9 in adult E66.09, Z68.32         Microbiology:        I have reviewed allavailable micro lab data and cultures    · Blood culture (2/2) - collected on 4/18/2019: Negative  · Urine culture  - collected on 4/18/2019: > 100,000 CFU per mL of multidrug resistant Klebsiella    Klebsiella pneumoniae ssp pneumoniae (1)     Antibiotic Interpretation JOSH Status    amoxicillin-clavulanate Sensitive 8/4 mcg/mL     ampicillin Resistant >16 mcg/mL     ceFAZolin Resistant >16 mcg/mL      NOTE: Cefazolin should only be used for uncomplicated UTI        for E.coli or Klebsiella pneumoniae. cefepime Resistant >16 mcg/mL     cefTRIAXone Resistant >32 mcg/mL     ciprofloxacin Sensitive 1 mcg/mL     gentamicin Sensitive <=2 mcg/mL     levofloxacin Sensitive <=1 mcg/mL     meropenem Sensitive <=0.25 mcg/mL     nitrofurantoin Resistant >64 mcg/mL     piperacillin-tazobactam Sensitive 8/4 mcg/mL     tetracycline Resistant >8 mcg/mL     trimethoprim-sulfamethoxazole Resistant >2/38 mcg/mL           Assessment:     The patient is a 80 y.o. old female who  has a past medical history of Acute biliary pancreatitis without infection or necrosis (4/11/2018), Bundle branch block, right, Colon cancer ([facility] (04/2018), Depression, DJD (degenerative joint disease), Gallstones, GERD (gastroesophageal reflux disease), Macular degeneration, and Urinary incontinence, mixed. with following problems:    · Multidrug resistant Klebsiella UTI vs bactereuria  · Leukopenia  · Acute on chronic respiratory failure with hypoxia  · Interstitial lung disease and pulmonary fibrosis  · History of colon cancer  · History depression  · History of macular degeneration  · Macrocytic anemia  · History of colon cancer  · Obesity Class 1  due to excess calorie intake : Body mass index is 32.49 kg/m². ·       Discussion:      She is getting IV Solu-Medrol. The bacterial colony count in the urine culture is significant, however, she has no clinical signs or symptoms of UTI    Images of CT scan of the chest reviewed. No new infiltrate.   Pulmonary fibrosis changes noted    Pro calcitonin level is 0.05, which goes against a bacterial pneumonia        Plan:     Diagnostic Workup:    · Continue to follow fever curve, WBC count and blood cultures  · Follow up on liverand renal functions closely    Antimicrobials:    · Will hold off on starting any antibiotics as she is clinically asymptomatic  · If she develops any symptoms of UTI or fever, can start Po ciprofloxacin 500 mg q 12 hours  · Continue O2 support to maintain SaO2 > 92%  · Cough and deep breathing exercises. · DVT prophylaxis  · Discussed the above plan with patient and RN   · Discussed all above with her family at bedside. Weight loss counseling:    Extensive weight loss counseling was done. It is important to set a realistic weight loss goal. First goal should be to avoid gaining more weight and staying at current weight (or within 5 percent). People at high risk of developing diabetes who are able to lose 5 percent of their body weight and maintain this weight will reduce their risk of developing diabetes by about 50 percent and reduce their blood pressure. Losing more than 15 percent of  body weight and staying at this weight is an extremely good result, even if you never reach your \"dream\" or \"ideal\" weight. Lifestyle changes including changing eating habits, substituting excess carbohydrates with proteins, stress reduction, using self-help programs like Weight Watchers®, Overeaters Anonymous®, and Take Off Pounds Sensibly (TOPS)© , following DASH diet and increasing exercise or walking briskly daily for half hour to and hour 5-7 days a week was suggested among other measures. Information was given about various weight loss education programs and their websites like www.cdc.gov/healthyweight, www.choosemyplate.gov and www.health.gov/dietaryguidelines/    I/v access Management:    · Continue to monitor i.v access sites for erythema, induration, discharge or tenderness. · As always, continue efforts to minimizetubes/lines/drains as clinically appropriate to reduce chances of line associated infections.     Patient education and counseling:      · The patient was educated in detailabout the side-effects of Procedure Laterality Date    APPENDECTOMY  4/20/2108    Incidental during colectomy for cancer    BLADDER SURGERY  2000    tuck    CHOLECYSTECTOMY, OPEN  04/20/2018    HYSTERECTOMY  2000    ? prob with bladder tuck    RIGHT COLECTOMY  04/29/2018    19.5 cm (7.5 in);  Dr. Markell Marino at [place] [facility]       Social History:  All social andepidemiologic history was reviewed and updated by me today as needed. · Tobacco use:   reports that she has never smoked. She has never used smokeless tobacco.  · Alcohol use:   reports that she does not drink alcohol. · Currently lives in: [facility]  ·  reports that she does not use drugs. Immunization History: All immunization history was reviewed by me today. Immunization History   Administered Date(s) Administered    Influenza Virus Vaccine 09/21/2011, 09/26/2012    Influenza, High Dose (Fluzone 65 yrs and older) 09/15/2015, 09/26/2016, 09/25/2017, 09/26/2018    Pneumococcal 13-valent Conjugate ([id number]) 09/15/2015    Pneumococcal Polysaccharide (Kyavgspas89) 09/26/2012       Family History: All family history was reviewed today. Problem Relation Age of Onset    Diabetes Mother     Coronary Art Dis Mother         young onset   [place] Stroke Mother     COPD Brother         pulmonary fibrosis    Prostate Cancer Brother     COPD Brother     Cancer Brother         liver, skin    Stroke Daughter          Medications: All current and past medications were reviewed. Medications Prior to Admission: melatonin 3 MG TABS tablet, Take 10 mg by mouth nightly as needed  rOPINIRole (REQUIP) 1 MG tablet, TAKE TWO TABLETS BY MOUTH EVERY NIGHT AT BEDTIME  omeprazole (PRILOSEC) 20 MG delayed release capsule, TAKE ONE CAPSULE BY MOUTH DAILY  Misc. Devices [facility]) MISC, Use when short of breath and needs to move around without ambulation.   gabapentin (NEURONTIN) 100 MG capsule, TAKE TWO CAPSULES BY MOUTH TWICE A DAY, TAKE AT SUPPER AND BEDTIME FOR RESTLESS seizures, speech difficulty, light-headedness and headaches. Hematological: Negative for adenopathy. Psychiatric/Behavioral: Negative for agitation, hallucinations and suicidal ideas. Objective:       PHYSICAL EXAM:      Vitals:   Vitals:    04/20/19 0215 04/20/19 0426 04/20/19 0756 04/20/19 0854   BP:  (!) 140/74  108/69   Pulse: 74 75  101   Resp:  18 16 16   Temp:  98.3 °F (36.8 °C)  98 °F (36.7 °C)   TempSrc:  Temporal  Temporal   SpO2:  96% 95% 95%   Weight:  183 lb 6.4 oz (83.2 kg)     Height:           Physical Exam   Constitutional: She is oriented to person, place, and time. She appears well-developed. No distress. HENT:   Head: Normocephalic. Right Ear: External ear normal.   Left Ear: External ear normal.   Nose: Nose normal.   Mouth/Throat: Oropharynx is clear and moist.   Eyes: Pupils are equal, round, and reactive to light. Conjunctivae are normal. Right eye exhibits no discharge. Left eye exhibits no discharge. No scleral icterus. Neck: Normal range of motion. Neck supple. Cardiovascular: Normal rate and regular rhythm. Exam reveals no friction rub. No murmur heard. Pulmonary/Chest: Effort normal. No stridor. She has no wheezes. She has rales (b/l inspiratory crackles). She exhibits no tenderness. Abdominal: Soft. She exhibits no mass. There is no tenderness. There is no rebound and no guarding. Musculoskeletal: She exhibits no edema or tenderness. Lymphadenopathy:     She has no cervical adenopathy. Neurological: She is alert and oriented to person, place, and time. She exhibits normal muscle tone. Skin: Skin is warm and dry. No rash noted. She is not diaphoretic. No erythema. Psychiatric: She has a normal mood and affect. Judgment normal.   Nursing note and vitals reviewed. Lines: All vascular access sites are healthy with no local erythema, discharge or tenderness. Intake and output:     I/O last 3 completed shifts:   In: 20 [I.V.:20]  Out: 550 [Urine:550]    Lab Data:   All available labs were reviewed by me today. CBC:   Recent Labs     04/18/19 1838 04/20/19  0539   WBC 3.4* 2.1*   RBC 3.37* 3.59*   HGB 10.8* 11.5*   HCT 34.2* 36.6   * 137   .6* 102.1*   MCH 31.9 32.2   MCHC 31.4 31.5   RDW 18.2* 18.5*        BMP:  Recent Labs     04/18/19  1838 04/20/19  0539    138   K 5.0 4.4    100   CO2 25 26   BUN 22* 28*   CREATININE 0.8 1.0   CALCIUM 8.4 9.0   GLUCOSE 125* 147*        Hepatic FunctionPanel:   Lab Results   Component Value Date    ALKPHOS 108 04/18/2019    ALT 33 04/18/2019    AST 48 04/18/2019    PROT 7.2 04/18/2019    PROT 7.7 09/26/2012    BILITOT 0.4 04/18/2019    BILIDIR <0.2 11/20/2015    IBILI see below 11/20/2015    LABALBU 3.3 04/20/2019       CPK: No results found for: CKTOTAL  ESR: No results found for: SEDRATE  CRP: No results found for: CRP      Imaging: All pertinent images and reports for the current visit were reviewed by meduring this visit. CT Chest WO Contrast   Final Result   Moderate to severe chronic interstitial lung disease which has significantly   progressed since November 2018. No acute superimposed abnormality   identified. Mild bronchiectasis. Mild cardiomegaly. Small pericardial   effusion. Mild reactive mediastinal adenopathy. XR CHEST PORTABLE   Final Result   Previously described chronic increased lung markings suggest pulmonary   fibrosis. There is no focal consolidation appreciated. The chronic changes   could obscure subtle airspace disease. Outside records:    Labs, Microbiology, Radiology and pertinent results from Care everywhere, if available, were reviewed as a part ofthe consultation. Known drug Allergies: All allergies were reviewed and updated    Allergies   Allergen Reactions    Pcn [Penicillins]          Please note that this chart was generated using Dragon dictation software.  Although every effort was made to ensure the accuracy of this automated transcription, some errors in transcription may have occurred inadvertently. If you may need any clarification, please do not hesitate to contact me through EPIC or at the phone number provided below with my electronic signature.       Jan Jacobs MD, MPH  4/20/2019, 11:05 AM  [facility] Infectious Disease   Office: [phone]  Fax: [phone]  Tuesday AM clinic:   [address] [facility], [place] [address]  Thursday AM clinic: [address] [facility]

## 2019-04-20 NOTE — PROGRESS NOTES
Assessment complete. Pt awake, alert and oriented. O2 sat 95 % on 3 l NC. Pt uses 3L O2 at home all the time.  Urine culture positive for MDRO, message sent to hospitalist.

## 2019-04-21 ENCOUNTER — APPOINTMENT (OUTPATIENT)
Dept: CT IMAGING | Age: 84
DRG: 196 | End: 2019-04-21
Payer: MEDICARE

## 2019-04-21 LAB
ALBUMIN SERPL-MCNC: 3.4 G/DL (ref 3.4–5)
ALBUMIN SERPL-MCNC: 3.8 G/DL (ref 3.4–5)
ALP BLD-CCNC: 86 U/L (ref 40–129)
ALT SERPL-CCNC: 23 U/L (ref 10–40)
ANION GAP SERPL CALCULATED.3IONS-SCNC: 9 MMOL/L (ref 3–16)
AST SERPL-CCNC: 27 U/L (ref 15–37)
BASOPHILS ABSOLUTE: 0 K/UL (ref 0–0.2)
BASOPHILS RELATIVE PERCENT: 0 %
BILIRUB SERPL-MCNC: 0.5 MG/DL (ref 0–1)
BILIRUBIN DIRECT: <0.2 MG/DL (ref 0–0.3)
BILIRUBIN, INDIRECT: NORMAL MG/DL (ref 0–1)
BUN BLDV-MCNC: 37 MG/DL (ref 7–20)
CALCIUM SERPL-MCNC: 8.7 MG/DL (ref 8.3–10.6)
CHLORIDE BLD-SCNC: 104 MMOL/L (ref 99–110)
CO2: 26 MMOL/L (ref 21–32)
CREAT SERPL-MCNC: 0.8 MG/DL (ref 0.6–1.2)
EOSINOPHILS ABSOLUTE: 0 K/UL (ref 0–0.6)
EOSINOPHILS RELATIVE PERCENT: 0 %
GFR AFRICAN AMERICAN: >60
GFR NON-AFRICAN AMERICAN: >60
GLUCOSE BLD-MCNC: 147 MG/DL (ref 70–99)
HCT VFR BLD CALC: 33 % (ref 36–48)
HEMOGLOBIN: 10.5 G/DL (ref 12–16)
LIPASE: 27 U/L (ref 13–60)
LYMPHOCYTES ABSOLUTE: 0.4 K/UL (ref 1–5.1)
LYMPHOCYTES RELATIVE PERCENT: 8.4 %
MAGNESIUM: 2.2 MG/DL (ref 1.8–2.4)
MCH RBC QN AUTO: 32 PG (ref 26–34)
MCHC RBC AUTO-ENTMCNC: 32 G/DL (ref 31–36)
MCV RBC AUTO: 100.3 FL (ref 80–100)
MONOCYTES ABSOLUTE: 0.2 K/UL (ref 0–1.3)
MONOCYTES RELATIVE PERCENT: 5 %
NEUTROPHILS ABSOLUTE: 3.7 K/UL (ref 1.7–7.7)
NEUTROPHILS RELATIVE PERCENT: 86.6 %
PDW BLD-RTO: 18.5 % (ref 12.4–15.4)
PHOSPHORUS: 4.2 MG/DL (ref 2.5–4.9)
PLATELET # BLD: 118 K/UL (ref 135–450)
PMV BLD AUTO: 9.2 FL (ref 5–10.5)
POTASSIUM SERPL-SCNC: 4.8 MMOL/L (ref 3.5–5.1)
RBC # BLD: 3.29 M/UL (ref 4–5.2)
SODIUM BLD-SCNC: 139 MMOL/L (ref 136–145)
TOTAL PROTEIN: 7.7 G/DL (ref 6.4–8.2)
TROPONIN: <0.01 NG/ML
WBC # BLD: 4.3 K/UL (ref 4–11)

## 2019-04-21 PROCEDURE — 2700000000 HC OXYGEN THERAPY PER DAY

## 2019-04-21 PROCEDURE — 85025 COMPLETE CBC W/AUTO DIFF WBC: CPT

## 2019-04-21 PROCEDURE — 94762 N-INVAS EAR/PLS OXIMTRY CONT: CPT

## 2019-04-21 PROCEDURE — 83735 ASSAY OF MAGNESIUM: CPT

## 2019-04-21 PROCEDURE — 6370000000 HC RX 637 (ALT 250 FOR IP): Performed by: INTERNAL MEDICINE

## 2019-04-21 PROCEDURE — 84484 ASSAY OF TROPONIN QUANT: CPT

## 2019-04-21 PROCEDURE — 36415 COLL VENOUS BLD VENIPUNCTURE: CPT

## 2019-04-21 PROCEDURE — 80076 HEPATIC FUNCTION PANEL: CPT

## 2019-04-21 PROCEDURE — 84100 ASSAY OF PHOSPHORUS: CPT

## 2019-04-21 PROCEDURE — 2580000003 HC RX 258: Performed by: INTERNAL MEDICINE

## 2019-04-21 PROCEDURE — 2500000003 HC RX 250 WO HCPCS: Performed by: INTERNAL MEDICINE

## 2019-04-21 PROCEDURE — 94640 AIRWAY INHALATION TREATMENT: CPT

## 2019-04-21 PROCEDURE — 93005 ELECTROCARDIOGRAM TRACING: CPT | Performed by: INTERNAL MEDICINE

## 2019-04-21 PROCEDURE — 6360000002 HC RX W HCPCS: Performed by: INTERNAL MEDICINE

## 2019-04-21 PROCEDURE — 83690 ASSAY OF LIPASE: CPT

## 2019-04-21 PROCEDURE — 6370000000 HC RX 637 (ALT 250 FOR IP): Performed by: NURSE PRACTITIONER

## 2019-04-21 PROCEDURE — 80048 BASIC METABOLIC PNL TOTAL CA: CPT

## 2019-04-21 PROCEDURE — 99233 SBSQ HOSP IP/OBS HIGH 50: CPT | Performed by: INTERNAL MEDICINE

## 2019-04-21 PROCEDURE — 94760 N-INVAS EAR/PLS OXIMETRY 1: CPT

## 2019-04-21 PROCEDURE — 74176 CT ABD & PELVIS W/O CONTRAST: CPT

## 2019-04-21 PROCEDURE — 1200000000 HC SEMI PRIVATE

## 2019-04-21 RX ORDER — PROMETHAZINE HYDROCHLORIDE 25 MG/ML
6.25 INJECTION, SOLUTION INTRAMUSCULAR; INTRAVENOUS
Status: COMPLETED | OUTPATIENT
Start: 2019-04-21 | End: 2019-04-21

## 2019-04-21 RX ORDER — MORPHINE SULFATE 2 MG/ML
2 INJECTION, SOLUTION INTRAMUSCULAR; INTRAVENOUS
Status: COMPLETED | OUTPATIENT
Start: 2019-04-21 | End: 2019-04-21

## 2019-04-21 RX ORDER — PREDNISONE 20 MG/1
40 TABLET ORAL DAILY
Status: DISCONTINUED | OUTPATIENT
Start: 2019-04-22 | End: 2019-04-22 | Stop reason: HOSPADM

## 2019-04-21 RX ADMIN — IPRATROPIUM BROMIDE AND ALBUTEROL SULFATE 1 AMPULE: .5; 3 SOLUTION RESPIRATORY (INHALATION) at 08:40

## 2019-04-21 RX ADMIN — ROPINIROLE HYDROCHLORIDE 2 MG: 1 TABLET, FILM COATED ORAL at 23:30

## 2019-04-21 RX ADMIN — Medication 10 ML: at 23:31

## 2019-04-21 RX ADMIN — MORPHINE SULFATE 2 MG: 2 INJECTION, SOLUTION INTRAMUSCULAR; INTRAVENOUS at 19:28

## 2019-04-21 RX ADMIN — FAMOTIDINE 20 MG: 10 INJECTION, SOLUTION INTRAVENOUS at 23:30

## 2019-04-21 RX ADMIN — OXYBUTYNIN CHLORIDE 10 MG: 5 TABLET, EXTENDED RELEASE ORAL at 08:16

## 2019-04-21 RX ADMIN — LIDOCAINE HYDROCHLORIDE: 20 SOLUTION ORAL; TOPICAL at 19:28

## 2019-04-21 RX ADMIN — IPRATROPIUM BROMIDE AND ALBUTEROL SULFATE 1 AMPULE: .5; 3 SOLUTION RESPIRATORY (INHALATION) at 15:32

## 2019-04-21 RX ADMIN — ENOXAPARIN SODIUM 40 MG: 40 INJECTION SUBCUTANEOUS at 08:16

## 2019-04-21 RX ADMIN — PROMETHAZINE HYDROCHLORIDE 6.25 MG: 25 INJECTION INTRAMUSCULAR; INTRAVENOUS at 17:42

## 2019-04-21 RX ADMIN — ONDANSETRON 4 MG: 2 INJECTION INTRAMUSCULAR; INTRAVENOUS at 16:31

## 2019-04-21 RX ADMIN — THERA TABS 1 TABLET: TAB at 08:16

## 2019-04-21 RX ADMIN — METHYLPREDNISOLONE SODIUM SUCCINATE 40 MG: 40 INJECTION, POWDER, FOR SOLUTION INTRAMUSCULAR; INTRAVENOUS at 08:16

## 2019-04-21 RX ADMIN — IPRATROPIUM BROMIDE AND ALBUTEROL SULFATE 1 AMPULE: .5; 3 SOLUTION RESPIRATORY (INHALATION) at 12:16

## 2019-04-21 RX ADMIN — Medication 10 ML: at 08:16

## 2019-04-21 ASSESSMENT — PAIN SCALES - GENERAL
PAINLEVEL_OUTOF10: 5
PAINLEVEL_OUTOF10: 0
PAINLEVEL_OUTOF10: 8
PAINLEVEL_OUTOF10: 0

## 2019-04-21 NOTE — PROGRESS NOTES
Assessment completed. Alert and oriented x4. Lung sounds are clear and diminished at bases bilaterally. No SOB at rest, on 3L. BLE edema present, see flowsheet. Patient denies pain. No further needs at this time. Fall precautions in place, call light within reach. Will continue to monitor.

## 2019-04-21 NOTE — PROGRESS NOTES
Appears comfortable  Eyes: Sclera clear. Pupils equal.  ENT: Moist oral mucosa. Trachea midline, no adenopathy. Cardiovascular: Regular rhythm, normal S1, S2. No murmur. No edema in lower extremities  Respiratory: Not using accessory muscles. Good inspiratory effort. Clear to auscultation bilaterally, no wheeze or crackles. GI: Abdomen soft, no tenderness, not distended, normal bowel sounds  Musculoskeletal: No cyanosis in digits, neck supple  Neurology: CN 2-12 grossly intact. No speech or motor deficits  Psych: Normal affect. Alert and oriented in time, place and person  Skin: Warm, dry, normal turgor    Labs and Tests:  CBC:   Recent Labs     04/18/19 1838 04/20/19  0539 04/21/19  0643   WBC 3.4* 2.1* 4.3   HGB 10.8* 11.5* 10.5*   * 137 118*     BMP:    Recent Labs     04/18/19 1838 04/20/19  0539 04/21/19  0643    138 139   K 5.0 4.4 4.8    100 104   CO2 25 26 26   BUN 22* 28* 37*   CREATININE 0.8 1.0 0.8   GLUCOSE 125* 147* 147*     Hepatic:   Recent Labs     04/18/19 1838   AST 48*   ALT 33   BILITOT 0.4   ALKPHOS 108         Problem List  Principal Problem:    Acute respiratory failure with hypoxia (HCC)  Active Problems:    Acute on chronic respiratory failure with hypoxia (HCC)    Pulmonary fibrosis (HCC)    Shortness of breath    UTI due to Klebsiella species    MDRO (multiple drug resistant organisms) resistance    Leukopenia    History of colon cancer    H/O: depression    Macrocytic anemia    Class 1 obesity due to excess calories with body mass index (BMI) of 32.0 to 32.9 in adult  Resolved Problems:    * No resolved hospital problems. *       Assessment & Plan:   Pulmonary Fibrosis -  UIP  - CT evidence of honeycomb fibrosis, traction bronchiectasis and heterogeneous distribution of fibrosis.   11/18 CT stable  - currently on 3L but desats with any exertion  - O2 prn keep SaO2 92%  - I doubt there is any infection, pro-calcitonin is within normal  - Started on Solu-Medrol  - Respiratory film by PCR pending results. - Pulmonology appreciate recommendations     Acute respiratory failure/Hypoxia/Shortness of Breath - due to above; provide supplemental oxygen as necessary to keep SaO2 92% or greater. Bilateral basal crackles, not sure if it is secondary to fibrosis versus trace IVF,? Lasix.     Urinalysis came back positive for Klebsiella more than 100,000 CFU and it was multi-drug-resistant organism, patient denies any symptoms at the present time, she denies any frequency, dysuria or suprapubic pressure, I'm not sure in the setting of her pulmonary fibrosis and increased work of breathing whether we should treat it or not, ID consulted, will monitor for now      Diet: DIET GENERAL;  Code:Full Code  DVT PPX lovenox      Jessica Daniel MD   4/21/2019 10:48 AM

## 2019-04-21 NOTE — PROGRESS NOTES
Paged by RN, patient started to have left upper quadrant pain, associated with nausea, went to examine the patient, abdomen was soft, patient did vomited once, bowel sound are positive, no history of heart disease. We'll give her Pepcid, Phenergan and morphine.  - Check a troponin  - Stat EKG  - LFT  - Stat CT scan of the abdomen  - GI cocktail pain is not better  - We'll elect the night team know about the situation for further follow-up.

## 2019-04-21 NOTE — PROGRESS NOTES
Pt complaining of pain on right side of abdomen. She also stated that she was feeling very nauseous. Charge RN informed the pt's nurse of the issues and gave IV zofran to the patient. Pt does not have pain medications ordered. Pt's RN states that she will place perfect serve to hospitalist after she assesses the situation. Will continue to monitor.   Anastacia Norman

## 2019-04-21 NOTE — PROGRESS NOTES
11.5* 10.5*   HCT 34.2* 36.6 33.0*   .6* 102.1* 100.3*   * 137 118*     BMP:  Recent Labs     04/18/19 1838 04/20/19  0539 04/21/19  0643    138 139   K 5.0 4.4 4.8    100 104   CO2 25 26 26   PHOS  --  4.8 4.2   BUN 22* 28* 37*   CREATININE 0.8 1.0 0.8     LIVER PROFILE:   Recent Labs     04/18/19 1838   AST 48*   ALT 33   BILITOT 0.4   ALKPHOS 108     PT/INR:  Recent Labs     04/18/19 1838   PROTIME 12.6   INR 1.11     APTT:   Recent Labs     04/18/19 1838   APTT 32.1     UA:  Recent Labs     04/18/19 1917   COLORU YELLOW   PHUR 6.0   WBCUA 4   RBCUA 1   BACTERIA 4+*   CLARITYU CLOUDY*   SPECGRAV 1.022   LEUKOCYTESUR Negative   UROBILINOGEN 1.0   BILIRUBINUR Negative   BLOODU TRACE*   GLUCOSEU Negative     No results for input(s): PHART, [id number], PO2ART in the last 72 hours. CXR and CT chest personally reviewed, UIP/IPF pattern without acute process overlying. Worsened since November          Assessment:     1. Acute on chronic resp failure, hypoxic   -UIP/IPF with possible mild acute exac  2. Asymptomatic bacteriuria    Plan:      -Changed steroids to 40mg, recommend continuing for four more days. Overall suspect that this is simply underlying disease progression with her fibrosis as opposed to acute infection/exacerbation. -PCT negative and no pneumonia findings on CT, therefore no atb needed from a pulm standpoint; currently stopped. -Titrate FIO2 as tolerated, would expect exertional desaturation with fibrosis. Needs oxygen continued post discharge.    -Monitor I/O balance, diuresis PRN   -DC planning per primary service    Amanda Birmingham MD

## 2019-04-21 NOTE — PROGRESS NOTES
MD responded back to RN with one time dose of phenergan, pepcid twice a day, and CT of abdomen. Lift pad to be placed under patient to help with getting up into CT.   Berto Danielle

## 2019-04-21 NOTE — PROGRESS NOTES
Message sent to hospitalist:  Pt states that she is having 10/10 pain in both upper and lower quadrants of the right side of her abdomen. She has nothing ordered for pain. Is there something that we can give to her? I gave her Zofran to help with the nausea she is having. Awaiting response.   Jesus Iba

## 2019-04-21 NOTE — PLAN OF CARE
Problem: Falls - Risk of:  Goal: Will remain free from falls  Description  Will remain free from falls  4/21/2019 1155 by Jil Melara RN  Outcome: Ongoing  Note:   Pt alert and oriented, call light and belongings within reach, bed in lowest position with wheels locked and alarm on, yellow blanket and fall risk wristband in place, nonskid footwear on. Instructed to call out for assistance. Verbalized understanding. Will continue to monitor.    4/21/2019 0407 by Leslee Hyde RN  Outcome: Ongoing

## 2019-04-21 NOTE — PROGRESS NOTES
Assessment completed and documented. The care plan and education has been reviewed and mutually agreed upon with the patient. Denies pain at this time. Dyspnea upon exertion. Will continue to monitor.

## 2019-04-22 VITALS
RESPIRATION RATE: 18 BRPM | SYSTOLIC BLOOD PRESSURE: 107 MMHG | TEMPERATURE: 97.9 F | WEIGHT: 183.4 LBS | DIASTOLIC BLOOD PRESSURE: 68 MMHG | BODY MASS INDEX: 32.5 KG/M2 | HEIGHT: 63 IN | HEART RATE: 90 BPM | OXYGEN SATURATION: 98 %

## 2019-04-22 LAB
ALBUMIN SERPL-MCNC: 3.3 G/DL (ref 3.4–5)
ANION GAP SERPL CALCULATED.3IONS-SCNC: 11 MMOL/L (ref 3–16)
BASOPHILS ABSOLUTE: 0 K/UL (ref 0–0.2)
BASOPHILS RELATIVE PERCENT: 0.2 %
BUN BLDV-MCNC: 35 MG/DL (ref 7–20)
CALCIUM SERPL-MCNC: 8.4 MG/DL (ref 8.3–10.6)
CHLORIDE BLD-SCNC: 104 MMOL/L (ref 99–110)
CO2: 27 MMOL/L (ref 21–32)
CREAT SERPL-MCNC: 0.8 MG/DL (ref 0.6–1.2)
EKG ATRIAL RATE: 102 BPM
EKG ATRIAL RATE: 124 BPM
EKG DIAGNOSIS: NORMAL
EKG DIAGNOSIS: NORMAL
EKG P AXIS: 58 DEGREES
EKG P AXIS: 62 DEGREES
EKG P-R INTERVAL: 168 MS
EKG P-R INTERVAL: 198 MS
EKG Q-T INTERVAL: 330 MS
EKG Q-T INTERVAL: 368 MS
EKG QRS DURATION: 112 MS
EKG QRS DURATION: 114 MS
EKG QTC CALCULATION (BAZETT): 474 MS
EKG QTC CALCULATION (BAZETT): 479 MS
EKG R AXIS: 110 DEGREES
EKG R AXIS: 97 DEGREES
EKG T AXIS: -29 DEGREES
EKG T AXIS: -37 DEGREES
EKG VENTRICULAR RATE: 102 BPM
EKG VENTRICULAR RATE: 124 BPM
EOSINOPHILS ABSOLUTE: 0 K/UL (ref 0–0.6)
EOSINOPHILS RELATIVE PERCENT: 0.1 %
GFR AFRICAN AMERICAN: >60
GFR NON-AFRICAN AMERICAN: >60
GLUCOSE BLD-MCNC: 94 MG/DL (ref 70–99)
HCT VFR BLD CALC: 34.2 % (ref 36–48)
HEMOGLOBIN: 10.9 G/DL (ref 12–16)
LYMPHOCYTES ABSOLUTE: 0.8 K/UL (ref 1–5.1)
LYMPHOCYTES RELATIVE PERCENT: 15.9 %
MAGNESIUM: 2.2 MG/DL (ref 1.8–2.4)
MCH RBC QN AUTO: 31.9 PG (ref 26–34)
MCHC RBC AUTO-ENTMCNC: 31.9 G/DL (ref 31–36)
MCV RBC AUTO: 100 FL (ref 80–100)
MONOCYTES ABSOLUTE: 0.4 K/UL (ref 0–1.3)
MONOCYTES RELATIVE PERCENT: 7.7 %
NEUTROPHILS ABSOLUTE: 3.8 K/UL (ref 1.7–7.7)
NEUTROPHILS RELATIVE PERCENT: 76.1 %
PDW BLD-RTO: 19.1 % (ref 12.4–15.4)
PHOSPHORUS: 3.4 MG/DL (ref 2.5–4.9)
PLATELET # BLD: 125 K/UL (ref 135–450)
PMV BLD AUTO: 9.7 FL (ref 5–10.5)
POTASSIUM SERPL-SCNC: 4.5 MMOL/L (ref 3.5–5.1)
RBC # BLD: 3.42 M/UL (ref 4–5.2)
SODIUM BLD-SCNC: 142 MMOL/L (ref 136–145)
WBC # BLD: 4.9 K/UL (ref 4–11)

## 2019-04-22 PROCEDURE — 80069 RENAL FUNCTION PANEL: CPT

## 2019-04-22 PROCEDURE — 6370000000 HC RX 637 (ALT 250 FOR IP): Performed by: INTERNAL MEDICINE

## 2019-04-22 PROCEDURE — 2700000000 HC OXYGEN THERAPY PER DAY

## 2019-04-22 PROCEDURE — 2500000003 HC RX 250 WO HCPCS: Performed by: INTERNAL MEDICINE

## 2019-04-22 PROCEDURE — 36415 COLL VENOUS BLD VENIPUNCTURE: CPT

## 2019-04-22 PROCEDURE — 99232 SBSQ HOSP IP/OBS MODERATE 35: CPT | Performed by: INTERNAL MEDICINE

## 2019-04-22 PROCEDURE — 6370000000 HC RX 637 (ALT 250 FOR IP): Performed by: NURSE PRACTITIONER

## 2019-04-22 PROCEDURE — 85025 COMPLETE CBC W/AUTO DIFF WBC: CPT

## 2019-04-22 PROCEDURE — 83735 ASSAY OF MAGNESIUM: CPT

## 2019-04-22 PROCEDURE — 6360000002 HC RX W HCPCS: Performed by: INTERNAL MEDICINE

## 2019-04-22 PROCEDURE — 94640 AIRWAY INHALATION TREATMENT: CPT

## 2019-04-22 PROCEDURE — 93010 ELECTROCARDIOGRAM REPORT: CPT | Performed by: INTERNAL MEDICINE

## 2019-04-22 PROCEDURE — 94762 N-INVAS EAR/PLS OXIMTRY CONT: CPT

## 2019-04-22 RX ORDER — GUAIFENESIN AND CODEINE PHOSPHATE 100; 10 MG/5ML; MG/5ML
5 SOLUTION ORAL 2 TIMES DAILY PRN
Qty: 1 BOTTLE | Refills: 0 | Status: SHIPPED | OUTPATIENT
Start: 2019-04-22 | End: 2019-04-23 | Stop reason: SDUPTHER

## 2019-04-22 RX ORDER — PREDNISONE 10 MG/1
40 TABLET ORAL 2 TIMES DAILY
Qty: 10 TABLET | Refills: 0 | Status: SHIPPED | OUTPATIENT
Start: 2019-04-22 | End: 2019-04-22 | Stop reason: SDUPTHER

## 2019-04-22 RX ORDER — PREDNISONE 10 MG/1
40 TABLET ORAL 2 TIMES DAILY
Qty: 10 TABLET | Refills: 0 | Status: SHIPPED | OUTPATIENT
Start: 2019-04-22 | End: 2019-04-25

## 2019-04-22 RX ADMIN — PREDNISONE 40 MG: 20 TABLET ORAL at 08:54

## 2019-04-22 RX ADMIN — FAMOTIDINE 20 MG: 10 INJECTION, SOLUTION INTRAVENOUS at 08:54

## 2019-04-22 RX ADMIN — OXYBUTYNIN CHLORIDE 10 MG: 5 TABLET, EXTENDED RELEASE ORAL at 08:54

## 2019-04-22 RX ADMIN — IPRATROPIUM BROMIDE AND ALBUTEROL SULFATE 1 AMPULE: .5; 3 SOLUTION RESPIRATORY (INHALATION) at 05:09

## 2019-04-22 RX ADMIN — THERA TABS 1 TABLET: TAB at 08:54

## 2019-04-22 RX ADMIN — ENOXAPARIN SODIUM 40 MG: 40 INJECTION SUBCUTANEOUS at 08:54

## 2019-04-22 RX ADMIN — BENZONATATE 100 MG: 100 CAPSULE ORAL at 08:54

## 2019-04-22 NOTE — PROGRESS NOTES
Discharge instructions given, all questions answered. Medications reviewed and side effects, patient and family verbalize understanding. Family denies needing any home care needs,patient states she has 10 kids that take good care of her. IV removed with tip intact.   Alexei Mcclendon

## 2019-04-22 NOTE — DISCHARGE SUMMARY
[facility]    Patient Demographics    Patient. Malachi Butts  Date of Birth. 1935  MRN. [id number]     Primary care provider. Carolyn Shirley MD  (Tel: [phone])    Admit date: 4/18/2019    Discharge date (blank if same as Note Date): 4/22/2019  Note Date: 4/23/2019     Reason for Hospitalization. Chief Complaint   Patient presents with    Shortness of Breath     Pt reports increased sob today, nasal drainage. Pt wears 3 liters of oxygen per cannula. Significant Findings. Principal Problem:    Acute respiratory failure with hypoxia (HCC)  Active Problems:    Acute on chronic respiratory failure with hypoxia (HCC)    Pulmonary fibrosis (HCC)    Shortness of breath    UTI due to Klebsiella species    MDRO (multiple drug resistant organisms) resistance    Leukopenia    History of colon cancer    H/O: depression    Macrocytic anemia    Class 1 obesity due to excess calories with body mass index (BMI) of 32.0 to 32.9 in adult  Resolved Problems:    * No resolved hospital problems. *   Asymptomatic UTI with MDR Klebsiella NO TX    Problems and results from this hospitalization that need follow up. 1. Pulmonary fibrosis follow up    Significant test results and incidental findings. 1.   CT ABDOMEN PELVIS WO CONTRAST Additional Contrast? None   Final Result   1. No CT evidence of an acute intra-abdominal or intrapelvic process. 2.  Diverticulosis coli without CT evidence of acute diverticulitis. 3. Degenerative changes lumbar spine. CT Chest WO Contrast   Final Result   Moderate to severe chronic interstitial lung disease which has significantly   progressed since November 2018. No acute superimposed abnormality   identified. Mild bronchiectasis. Mild cardiomegaly. Small pericardial   effusion. Mild reactive mediastinal adenopathy.          XR CHEST PORTABLE   Final Result   Previously described chronic increased lung markings suggest pulmonary   fibrosis. There is no focal consolidation appreciated. The chronic changes   could obscure subtle airspace disease. Invasive procedures and treatments. 1. None     Problem-based Hospital Course. Patient with a past medical history of pulmonary fibrosis, presented with worsening symptoms, was admitted and started on a steroid, pulmonology is following. ?  Natural course of the disease. Also the patient was found to have multidrug resistant bacteria in her urine, infectious disease is following, unless she has symptoms no need for antibiotics. Patient will be discharged home with tapering prednisone and cough syrup with codeine. Consults. IP CONSULT TO SPIRITUAL SERVICES  IP CONSULT TO PULMONOLOGY  IP CONSULT TO INFECTIOUS DISEASES    Physical examination on discharge day. /68   Pulse 90   Temp 97.9 °F (36.6 °C) (Oral)   Resp 18   Ht 5' 3\" (1.6 m)   Wt 183 lb 6.4 oz (83.2 kg)   SpO2 98%   BMI 32.49 kg/m²   General appearance. Alert. Looks comfortable. HEENT. Sclera clear. Moist mucus membranes. Cardiovascular. Regular rate and rhythm, normal S1, S2. No murmur. Respiratory. Not using accessory muscles. Clear to auscultation bilaterally, no wheeze. Gastrointestinal. Abdomen soft, non-tender, not distended, normal bowel sounds  Neurology. Facial symmetry. No speech deficits. Moving all extremities equally. Extremities. No edema in lower extremities. Skin. Warm, dry, normal turgor        Condition at time of discharge stable     Medication instructions provided to patient at discharge. Medication List      START taking these medications    guaiFENesin-codeine 100-10 MG/5ML syrup  Commonly known as:  TUSSI-ORGANIDIN NR  Take 5 mLs by mouth 2 times daily as needed for Cough for up to 3 days.      predniSONE 10 MG tablet  Commonly known as:  DELTASONE  Take 4 tablets by mouth 2 times daily for 3 days        CONTINUE taking these medications gabapentin 100 MG capsule  Commonly known as:  NEURONTIN  TAKE TWO CAPSULES BY MOUTH TWICE A DAY, TAKE AT SUPPER AND BEDTIME FOR RESTLESS LEGS     melatonin 3 MG Tabs tablet     multivitamin per tablet     omeprazole 20 MG delayed release capsule  Commonly known as:  PRILOSEC  TAKE ONE CAPSULE BY MOUTH DAILY     oxybutynin 10 MG extended release tablet  Commonly known as:  DITROPAN-XL  TAKE ONE TABLET BY MOUTH DAILY     rOPINIRole 1 MG tablet  Commonly known as:  REQUIP  TAKE TWO TABLETS BY MOUTH EVERY NIGHT AT BEDTIME     TYLENOL PM EXTRA STRENGTH PO     Wheelchair Misc  Use when short of breath and needs to move around without ambulation. STOP taking these medications    ferrous sulfate 325 (65 Fe) MG tablet           Where to Get Your Medications      You can get these medications from any pharmacy    Bring a paper prescription for each of these medications  · guaiFENesin-codeine 100-10 MG/5ML syrup  · predniSONE 10 MG tablet         Discharge recommendations given to patient. Follow Up. PCP , pulmonary   Disposition. home  Activity. activity as tolerated  Diet: No diet orders on file      Spent 35 minutes in discharge process.     Signed:  Mora Dunlap MD     4/23/2019 11:43 AM

## 2019-04-22 NOTE — PROGRESS NOTES
Patient asleep, family requested to let her sleep for a while. Will resume medications at a later time.

## 2019-04-22 NOTE — PROGRESS NOTES
Assessment completed, see flowsheet for details. Lung sounds are clear bilaterally. Bowel sounds are present in all quadrants. Pt A/O x4 Nausea and vomiting at the beginning of the shift, one time order of phenergan administered. Pt reported 0 on a 0-10 pain scale. Pulses are palpable in all extremities. Call light within reach. Will continue to monitor.

## 2019-04-22 NOTE — PROGRESS NOTES
Assessment complete. VSS. Lung sounds with fine crackles. Patient states when she coughs her mid chest hurts. Patient on 3 liters of o2 and maintaining >95%, 3 liters is patient baseline and on continuous at home. Call light within reach. Bed alarm on. Family at bedside.   [facility]

## 2019-04-22 NOTE — PROGRESS NOTES
Intravenous, 2 times per day  sodium chloride flush 0.9 % injection 10 mL, 10 mL, Intravenous, PRN  ondansetron (ZOFRAN) injection 4 mg, 4 mg, Intravenous, Q6H PRN  enoxaparin (LOVENOX) injection 40 mg, 40 mg, Subcutaneous, Daily    Allergies   Allergen Reactions    Pcn [Penicillins]        Social History:    TOBACCO:   reports that she has never smoked. She has never used smokeless tobacco.  ETOH:   reports that she does not drink alcohol. Patient currently lives independently  Environmental/chemical exposure: None known     Family History:       Problem Relation Age of Onset   Heloise Rosy Diabetes Mother     Coronary Art Dis Mother         young onset   Heloise Rosy Stroke Mother     COPD Brother         pulmonary fibrosis    Prostate Cancer Brother     COPD Brother     Cancer Brother         liver, skin    Stroke Daughter      REVIEW OF SYSTEMS:    CONSTITUTIONAL:  negative for fevers, chills, diaphoresis, activity change, appetite change, fatigue, night sweats and unexpected weight change.    EYES:  negative for blurred vision, eye discharge, visual disturbance and icterus  HEENT:  negative for hearing loss, tinnitus, ear drainage, sinus pressure, nasal congestion, epistaxis and snoring  RESPIRATORY:  See HPI  CARDIOVASCULAR:  negative for chest pain, palpitations, exertional chest pressure/discomfort, edema, syncope  GASTROINTESTINAL:  negative for nausea, vomiting, diarrhea, constipation, blood in stool and abdominal pain  GENITOURINARY:  negative for frequency, dysuria, urinary incontinence, decreased urine volume, and hematuria  HEMATOLOGIC/LYMPHATIC:  negative for easy bruising, bleeding and lymphadenopathy  ALLERGIC/IMMUNOLOGIC:  negative for recurrent infections, angioedema, anaphylaxis and drug reactions  ENDOCRINE:  negative for weight changes and diabetic symptoms including polyuria, polydipsia and polyphagia  MUSCULOSKELETAL:  negative for  pain, joint swelling, decreased range of motion and muscle weakness  NEUROLOGICAL:  negative for headaches, slurred speech, unilateral weakness  PSYCHIATRIC/BEHAVIORAL: negative for hallucinations, behavioral problems, confusion and agitation. Objective:   PHYSICAL EXAM:      VITALS:  /70   Pulse 89   Temp 97.6 °F (36.4 °C) (Temporal)   Resp 20   Ht 5' 3\" (1.6 m)   Wt 183 lb 6.4 oz (83.2 kg)   SpO2 97%   BMI 32.49 kg/m²      24HR INTAKE/OUTPUT:    No intake or output data in the 24 hours ending 04/22/19 0846  CONSTITUTIONAL:  awake, alert, cooperative, no apparent distress, and appears stated age  NECK:  Supple, symmetrical, trachea midline, no adenopathy, thyroid symmetric, not enlarged and no tenderness, skin normal  LUNGS:  no increased work of breathing and clear to auscultation. No accessory muscle use  CARDIOVASCULAR: S1 and S2, no edema and no JVD  ABDOMEN:  normal bowel sounds, non-distended and no masses palpated, and no tenderness to palpation. No hepatospleenomegaly  LYMPHADENOPATHY:  no axillary or supraclavicular adenopathy. No cervical adnenopathy  PSYCHIATRIC: Oriented to person place and time. No obvious depression or anxiety. MUSCULOSKELETAL: No obvious misalignment or effusion of the joints. No clubbing, cyanosis of the digits. SKIN:  normal skin color, texture, turgor and no redness, warmth, or swelling.  No palpable nodules    DATA:    Old records have been reviewed    CBC:  Recent Labs     04/20/19  0539 04/21/19  0643 04/22/19  0541   WBC 2.1* 4.3 4.9   RBC 3.59* 3.29* 3.42*   HGB 11.5* 10.5* 10.9*   HCT 36.6 33.0* 34.2*    118* 125*   .1* 100.3* 100.0   MCH 32.2 32.0 31.9   MCHC 31.5 32.0 31.9   RDW 18.5* 18.5* 19.1*      BMP:  Recent Labs     04/20/19  0539 04/21/19  0643 04/22/19  0541    139 142   K 4.4 4.8 4.5    104 104   CO2 26 26 27   BUN 28* 37* 35*   CREATININE 1.0 0.8 0.8   CALCIUM 9.0 8.7 8.4   GLUCOSE 147* 147* 94      ABG:  No results for input(s): PHART, AYK0EMF, PO2ART, GWT9UOI, Q7VPNGRY, BEART in the last 72 hours. No results found for: BNP  Lab Results   Component Value Date    TROPONINI <0.01 04/21/2019       Cultures:     Abx:    Radiology Review:  All pertinent images / reports were reviewed as a part of this visit. Assessment:     1. Acute on chronic hypoxemic respiratory failure  2. Usual interstitial pneumonitis  3. Possible acute interstitial pneumonitis    No evidence of acute interstitial pneumonitis or acute bronchopneumonia  Now on prednisone  Can likely stop this at the time of her discharge  Stable on 3 L nasal cannula oxygen  Patient has oxygen at home  Okay with me if she is discharged home when okay with her other MDs  We'll arrange for outpatient follow-up appointment.

## 2019-04-22 NOTE — PLAN OF CARE
Problem: Falls - Risk of:  Goal: Will remain free from falls  Description  Will remain free from falls  Outcome: Ongoing  Goal: Absence of physical injury  Description  Absence of physical injury  Outcome: Ongoing     Problem: Gas Exchange - Impaired:  Goal: Levels of oxygenation will improve  Description  Levels of oxygenation will improve  Outcome: Ongoing     Problem: Pain:  Goal: Pain level will decrease  Description  Pain level will decrease  Outcome: Ongoing  Goal: Control of acute pain  Description  Control of acute pain  Outcome: Ongoing  Goal: Control of chronic pain  Description  Control of chronic pain  Outcome: Ongoing

## 2019-04-22 NOTE — PROGRESS NOTES
Larry Roe with pt  re: home care plan of care/services. Pt declined home care. Discharge planner notified.

## 2019-04-23 ENCOUNTER — CARE COORDINATION (OUTPATIENT)
Dept: CASE MANAGEMENT | Age: 84
End: 2019-04-23

## 2019-04-23 DIAGNOSIS — J96.21 ACUTE ON CHRONIC RESPIRATORY FAILURE WITH HYPOXIA (HCC): Primary | ICD-10-CM

## 2019-04-23 LAB
BLOOD CULTURE, ROUTINE: NORMAL
CULTURE, BLOOD 2: NORMAL

## 2019-04-23 PROCEDURE — 1111F DSCHRG MED/CURRENT MED MERGE: CPT | Performed by: FAMILY MEDICINE

## 2019-04-23 RX ORDER — GUAIFENESIN AND CODEINE PHOSPHATE 100; 10 MG/5ML; MG/5ML
5 SOLUTION ORAL 2 TIMES DAILY PRN
Qty: 1 BOTTLE | Refills: 0 | Status: SHIPPED | OUTPATIENT
Start: 2019-04-23 | End: 2019-04-26

## 2019-04-23 NOTE — PROGRESS NOTES
Message paged to hospitalist:     Dr. Margy Gutierres, you discharged this patient yesterday. The daughter called and stated that only one of the scripts when though to pharmacy. She is missing the guaifenesin- codeine 100-10mg/5ml syrup. Is this something that you can send over, does she need a paper script, or does the daughter need to contact the PCP? If you could call and let me know. Awaiting response.   Conner Read

## 2019-04-23 NOTE — DISCHARGE INSTR - COC
Continuity of Care Form    Patient Name: Mingo Serra   :  1935  MRN:  [id number]    Admit date:  2019  Discharge date:  ***    Code Status Order: Prior   Advance Directives:   [facility] Documentation     Date/Time Healthcare Directive Type of Healthcare Directive Copy in [address] Agent's Name Healthcare Agent's Phone Number    19 [phone]  No, patient does not have an advance directive for healthcare treatment -- -- -- -- --          Admitting Physician:  Herman Mari MD  PCP: Nehal Schulte MD    Discharging Nurse: [facility] Unit/Room#: N5R-7748/4224-25  Discharging Unit Phone Number: ***    Emergency Contact:   Extended Emergency Contact Information  Primary Emergency Contact: Tari Cervantes [address] [place] [address] Phone: [phone]  Mobile Phone: [phone]  Relation: Child  Secondary Emergency Contact: [address] [place] [address] Phone: [phone]  Relation: Child    Past Surgical History:  Past Surgical History:   Procedure Laterality Date    APPENDECTOMY  2108    Incidental during colectomy for cancer    BLADDER SURGERY      tuck    CHOLECYSTECTOMY, OPEN  2018    HYSTERECTOMY  2000    ?  prob with bladder tuck    RIGHT COLECTOMY  2018    19.5 cm (7.5 in);  [facility]       Immunization History:   Immunization History   Administered Date(s) Administered    Influenza Virus Vaccine 2011, 2012    Influenza, High Dose (Fluzone 65 yrs and older) 09/15/2015, 2016, 2017, 2018    Pneumococcal 13-valent Conjugate (Dzutnql97) 09/15/2015    Pneumococcal Polysaccharide ([id number]) 2012       Active Problems:  Patient Active Problem List   Diagnosis Code    GERD (gastroesophageal reflux disease) K21.9    Primary osteoarthritis involving multiple joints M15.0    Macular degeneration H35.30    Psoriasis L40.9    Urinary incontinence, mixed N39.46    Osteoarthritis, knee M17.10    Restless legs syndrome G25.81    Chest rales R09.89    Chronic cough R05    UIP (usual interstitial pneumonitis) ([facility]) J84.112    Acute on chronic respiratory failure with hypoxia ([facility]) J96.21    Malignant neoplasm of ascending colon (HCC);  positive subserosal adipose, negative lymph nodes C18.2    Pulmonary fibrosis (HCC) J84.10    Acute respiratory failure with hypoxia ([facility]) J96.01    Shortness of breath R06.02    UTI due to Klebsiella species N39.0, B96.1    MDRO (multiple drug resistant organisms) resistance Z16.35    Leukopenia D72.819    History of colon cancer Z85.038    H/O: depression Z86.59    Macrocytic anemia D53.9    Class 1 obesity due to excess calories with body mass index (BMI) of 32.0 to 32.9 in adult E66.09, Z68.32       Isolation/Infection:   Isolation          No Isolation        Patient Infection Status     Infection Encounter Level?  Onset Date Added Added By Resolved Resolved By Review Date    MDRO (multi-drug resistant organism) No 04/18/19 04/20/19 Urine Culture             Nurse Assessment:  Last Vital Signs: /68   Pulse 90   Temp 97.9 °F (36.6 °C) (Oral)   Resp 18   Ht 5' 3\" (1.6 m)   Wt 183 lb 6.4 oz (83.2 kg)   SpO2 98%   BMI 32.49 kg/m²     Last documented pain score (0-10 scale): Pain Level: 8  Last Weight:   Wt Readings from Last 1 Encounters:   04/22/19 183 lb 6.4 oz (83.2 kg)     Mental Status:  {IP PT MENTAL STATUS:68529}    IV Access:  { [facility]LA IV ACCESS:169010560}    Nursing Mobility/ADLs:  Walking   {[facility] DME KALZ:055702461}  Transfer  {[facility] DME XPFM:574081998}  Bathing  {[facility] DME CXIM:981636995}  Dressing  {[facility] DME SRTO:731086422}  Toileting  {[facility] DME LJKD:273287817}  Feeding  {[facility] DME YULH:884749250}  Med Admin  {[facility] DME JHQI:503937967}  Med Delivery   { [facility]LA MED Delivery:704344951}    Wound Care Documentation and Therapy:        Elimination:  Continence:   · Bowel: {YES / FD:33367}  · Bladder: {YES / OC:19573}  Urinary Catheter: {Urinary Catheter:444378211}   Colostomy/Ileostomy/Ileal Conduit: {YES / ZY:86839}       Date of Last BM: ***  No intake or output data in the 24 hours ending 19 0924  I/O last 3 completed shifts:   In: 10 [I.V.:10]  Out: -     Safety Concerns:     [facility] Safety Concerns:542696454}    Impairments/Disabilities:      [facility] Impairments/Disabilities:013023268}    Nutrition Therapy:  Current Nutrition Therapy:   [facility] Diet List:172427406}    Routes of Feeding: {[facility] DME Other Feedings:445878129}  Liquids: {Slp liquid thickness:20570}  Daily Fluid Restriction: {[facility] DME Yes amt example:440492182}  Last Modified Barium Swallow with Video (Video Swallowing Test): {Done Not Done SHCE:635277817}    Treatments at the Time of Hospital Discharge:   Respiratory Treatments: ***  Oxygen Therapy:  {Therapy; copd oxygen:63538}  Ventilator:    { CC Vent YFCC:234966905}    Rehab Therapies: {THERAPEUTIC INTERVENTION:7726575811}  Weight Bearing Status/Restrictions: [facility] Weight Bearin}  Other Medical Equipment (for information only, NOT a DME order):  {EQUIPMENT:821614698}  Other Treatments: ***    Patient's personal belongings (please select all that are sent with patient):  {[facility] DME Belongings:629785061}    RN SIGNATURE:  {Esignature:937712674}    CASE MANAGEMENT/SOCIAL WORK SECTION    Inpatient Status Date: ***    Readmission Risk Assessment Score:  Readmission Risk              Risk of Unplanned Readmission:        0           Discharging to Facility/ Agency   · Name:   · Address:  · Phone:  · Fax:    Dialysis Facility (if applicable)   · Name:  · Address:  · Dialysis Schedule:  · Phone:  · Fax:    / signature: {Esignature:684853143}    PHYSICIAN SECTION    Prognosis: {Prognosis:3516708233}    Condition at Discharge: [facility] Patient Condition:636009987}    Rehab Potential (if transferring to Rehab): {Prognosis:1789200572}    Recommended Labs or Other Treatments After Discharge: ***    Physician Certification: I certify the above information and transfer of Harrison Mcfadden  is necessary for the continuing treatment of the diagnosis listed and that she requires {Admit to Appropriate Level of Care:93460} for {GREATER/LESS:779670859} 30 days.      Update Admission H&P: {CHP DME Changes in CIICW:057435964}    PHYSICIAN SIGNATURE:  {Esignature:469899261}

## 2019-04-23 NOTE — PROGRESS NOTES
RN talked with Dr. Candy Walker (discharging MD), he printed a script and signed it. Daughter called and told that the MD filled the script and she stated she will come pick it up. Daughter is satisfied with how the situation is being held.   Phil Serrato

## 2019-04-30 ENCOUNTER — CARE COORDINATION (OUTPATIENT)
Dept: CASE MANAGEMENT | Age: 84
End: 2019-04-30

## 2019-04-30 NOTE — CARE COORDINATION
[facility] Transitions Follow Up Call    2019    Patient: Sweta Moreau  Patient : 1935   MRN: [id number]  Reason for Admission: SOB  Discharge Date: 19 RARS: Readmission Risk Score: 18             Care Transitions Subsequent and Final Call    Subsequent and Final Calls  Care Transitions Interventions  Other Interventions: Follow Up: Follow up outreach call attempt, no answer x two contact numbers. CTC left VM with contact information and request for return call. CTC will continue with outreach call attempts.     Future Appointments   Date Time Provider Ilia Pichardoi   2019 11:00 AM CLAUDE Manzo - CNP [facility]   7/10/2019 11:00 AM Yfn Edmond MD [facility]   2019 10:30 AM Margarita Pino MD [facility]       Yumiko Sher RN

## 2019-05-01 ENCOUNTER — OFFICE VISIT (OUTPATIENT)
Dept: PULMONOLOGY | Age: 84
End: 2019-05-01
Payer: MEDICARE

## 2019-05-01 VITALS
BODY MASS INDEX: 30.52 KG/M2 | OXYGEN SATURATION: 98 % | HEIGHT: 65 IN | DIASTOLIC BLOOD PRESSURE: 72 MMHG | HEART RATE: 89 BPM | SYSTOLIC BLOOD PRESSURE: 128 MMHG

## 2019-05-01 DIAGNOSIS — J96.11 CHRONIC RESPIRATORY FAILURE WITH HYPOXIA (HCC): ICD-10-CM

## 2019-05-01 DIAGNOSIS — Z09 HOSPITAL DISCHARGE FOLLOW-UP: ICD-10-CM

## 2019-05-01 DIAGNOSIS — J84.112 UIP (USUAL INTERSTITIAL PNEUMONITIS) (HCC): Primary | ICD-10-CM

## 2019-05-01 PROCEDURE — G8399 PT W/DXA RESULTS DOCUMENT: HCPCS | Performed by: NURSE PRACTITIONER

## 2019-05-01 PROCEDURE — 1123F ACP DISCUSS/DSCN MKR DOCD: CPT | Performed by: NURSE PRACTITIONER

## 2019-05-01 PROCEDURE — G8427 DOCREV CUR MEDS BY ELIG CLIN: HCPCS | Performed by: NURSE PRACTITIONER

## 2019-05-01 PROCEDURE — 1111F DSCHRG MED/CURRENT MED MERGE: CPT | Performed by: NURSE PRACTITIONER

## 2019-05-01 PROCEDURE — 1036F TOBACCO NON-USER: CPT | Performed by: NURSE PRACTITIONER

## 2019-05-01 PROCEDURE — 99213 OFFICE O/P EST LOW 20 MIN: CPT | Performed by: NURSE PRACTITIONER

## 2019-05-01 PROCEDURE — 4040F PNEUMOC VAC/ADMIN/RCVD: CPT | Performed by: NURSE PRACTITIONER

## 2019-05-01 PROCEDURE — G8417 CALC BMI ABV UP PARAM F/U: HCPCS | Performed by: NURSE PRACTITIONER

## 2019-05-01 PROCEDURE — 1090F PRES/ABSN URINE INCON ASSESS: CPT | Performed by: NURSE PRACTITIONER

## 2019-05-01 ASSESSMENT — ENCOUNTER SYMPTOMS
ABDOMINAL PAIN: 0
COUGH: 0
COLOR CHANGE: 0
SHORTNESS OF BREATH: 1
CONSTIPATION: 0

## 2019-05-01 NOTE — PROGRESS NOTES
[facility]    Subjective:   CHIEF COMPLAINT / HPI: Recent hospitalization for acute on chronic hypoxemic respiratory failure related to UIP   The patient is 80 y.o. female who presents today for a routine follow up visit related to the above mentioned issues. There is a PMH significant for colon cancer, RBBB, UIP pulmonary fibrosis who did not tolerate OFEV with subsequent oxygen dependence. She was evalauted in the office by Dr. Roxana Herbert on 4/8 and started on Esbriet. She presented to the ED 4/18 w/ SOB. Culture revealed multidrug resistant Klebsiella in the urine and was followed by ID during her stay. CT did not show any acute infiltrate and symptoms were thought to represent disease progression rather than acute overlying respiratory process. She was treated with systemic steroids. Presently she reports SOB back to baseline. There is no cough, fever, chills, gi complaints or edema. She continues to tolerate Esbriet and is now at 2 pills TID. She isn't sure if she is still on Prednisone but doesn't think she takes other pills. LFT on 4/21 was WNL. She requires 3L NC O2 continuously.         Past Medical History:   Diagnosis Date    Acute biliary pancreatitis without infection or necrosis 4/11/2018    Bundle branch block, right     [place] [address] [place]    [facility] [place] Depression     DJD (degenerative joint disease)     primarily knees    Gallstones     GERD (gastroesophageal reflux disease)     Macular degeneration     significant    MDRO (multiple drug resistant organisms) resistance 04/18/2019    urine    Urinary incontinence, mixed      Social History:    Social History     Tobacco Use   Smoking Status Never Smoker   Smokeless Tobacco Never Used     Current Medications:     Current Outpatient Medications on File Prior to Visit   Medication Sig Dispense Refill    melatonin 3 MG TABS tablet Take 10 mg by mouth nightly as needed      rOPINIRole (REQUIP) 1 MG tablet TAKE TWO TABLETS BY MOUTH EVERY NIGHT AT BEDTIME 60 tablet 5    omeprazole (PRILOSEC) 20 MG delayed release capsule TAKE ONE CAPSULE BY MOUTH DAILY 30 capsule 5    Misc. Devices [facility]) MISC Use when short of breath and needs to move around without ambulation. 1 each 0    gabapentin (NEURONTIN) 100 MG capsule TAKE TWO CAPSULES BY MOUTH TWICE A DAY, TAKE AT SUPPER AND BEDTIME FOR RESTLESS LEGS 120 capsule 1    oxybutynin (DITROPAN-XL) 10 MG extended release tablet TAKE ONE TABLET BY MOUTH DAILY 30 tablet 11    multivitamin (THERAGRAN) per tablet Take 1 tablet by mouth daily.  Diphenhydramine-APAP, sleep, (TYLENOL PM EXTRA STRENGTH PO) Take  by mouth nightly. No current facility-administered medications on file prior to visit. Review of Systems   Constitutional: Negative for chills and fever. HENT: Negative for congestion and postnasal drip. Respiratory: Positive for shortness of breath. Negative for cough. Cardiovascular: Negative for chest pain and leg swelling. Gastrointestinal: Negative for abdominal pain and constipation. Musculoskeletal: Negative for arthralgias and joint swelling. Skin: Negative for color change and pallor. Allergic/Immunologic: Negative for environmental allergies and food allergies. Psychiatric/Behavioral: Negative for agitation and confusion. Objective:       VITALS:  /72   Pulse 89   Ht 5' 5\" (1.651 m)   SpO2 98% Comment: 3 lpm  BMI 30.52 kg/m²      Physical Exam   Constitutional: She is oriented to person, place, and time. She appears well-developed and well-nourished. No distress. HENT:   Head: Normocephalic. Mouth/Throat: No oropharyngeal exudate. Eyes: Pupils are equal, round, and reactive to light. Conjunctivae are normal. Right eye exhibits no discharge. Left eye exhibits no discharge. Neck: Normal range of motion. Neck supple. No tracheal deviation present.    Cardiovascular: Normal rate, regular rhythm and intact distal pulses. Exam reveals no friction rub. Pulmonary/Chest: Effort normal. No accessory muscle usage or stridor. No tachypnea. No respiratory distress. She has no wheezes. She has rales in the right lower field and the left lower field. She exhibits no tenderness and no crepitus. Abdominal: Soft. Bowel sounds are normal. She exhibits no distension. There is no tenderness. Musculoskeletal: Normal range of motion. She exhibits no edema. Lymphadenopathy:     She has no cervical adenopathy. Neurological: She is alert and oriented to person, place, and time. Skin: Skin is warm and dry. No erythema. Psychiatric: She has a normal mood and affect. Thought content normal.   Vitals reviewed. DATA:      Radiology Review:  Pertinent images / reports were reviewed as a part of this visit. CT Chest done 4/19/19 reveals the following: Moderate to severe chronic interstitial lung disease which has significantly progressed since November 2018. No acute superimposed abnormality identified. Mild bronchiectasis. Mild cardiomegaly. Small pericardial effusion. Mild reactive mediastinal adenopathy. Last PFTs done Oct 2017:  Spirometry attempts were acceptable and reproducible. FVC was normal at 1.82 liters, 81% predicted and normal FEV1 of 1.61  liters, 97% predicted. FEV1/FVC ratio was normal.  Lung volumes  showed decreased total lung capacity of 73% predicted. Diffusion  capacity showed decreased DLCO of 39% predicted. IMPRESSION:  Mild restrictive pattern seen on this pulmonary function  test with severe decrease in diffusion capacity. In comparison to the  test that was done in 07/2016, FVC has decreased by 9% in total lung    Assessment / Plan:   1. Chronic respiratory failure with hypoxia (HCC)  - She is stable on usual liter flow  - She continues to benefit from 3L NC continuously including portable tanks to improve mobility in the home    2.  UIP (usual interstitial pneumonitis) [facility])  - CT chest done this hospitalization shows progression of disease  - She was started on Esbriet on 4/8 and has recently up titrated to 2 pills TID  - She is tolerating this well, continue  - Unclear if she is on Prednisone at this point, she will call me when she gets home  - Full PFT Study Without Bronchodilator as this hasn't been done since 2017  - LFT on 4/21 was WNL, f/u LFT in May and monthly x 6 months, then q3 months    3. Hospital discharge follow-up  - Overall she has improved, unclear if that is with the help of Prednisone as is noted above  - Repeat PFT / labs    Return in about 3 months (around 8/1/2019). RTC sooner if symptoms worsen acutely.      Lorelei Street MSN APRN-ACNP CCRN

## 2019-05-02 NOTE — CARE COORDINATION
[facility] Transitions Follow Up Call    2019    Patient: Landy Muhammad  Patient : 1935   MRN: [id number]  Reason for Admission:   Discharge Date: 19 RARS: Readmission Risk Score: 25         Spoke with: [address] Transitions Subsequent and Final Call    Subsequent and Final Calls  Do you have any ongoing symptoms?:  No  Have your medications changed?:  No  Do you have any questions related to your medications?:  No  Do you currently have any active services?:  No  Do you have any needs or concerns that I can assist you with?:  No  Identified Barriers:  None  Care Transitions Interventions  No Identified Needs  Other Interventions:          Pt states doing well, no issues or concerns. Denies CP, SOB, fever. Had f/u appt with PCP, no changes in meds or tx plan.  No need for further f/u CTC calls      Follow Up  Future Appointments   Date Time Provider Ilia Mohamud   7/10/2019 11:00 AM Mary Talbert MD [facility]   2019 11:00 AM Monserrat Snider APRN - CNP [facility]   2019 10:30 AM Lucas Moreno MD [facility]       Wyatt Beckham, RN

## 2019-05-14 RX ORDER — PIRFENIDONE 267 MG/1
TABLET, COATED ORAL
Qty: 207 TABLET | Refills: 3 | Status: SHIPPED | OUTPATIENT
Start: 2019-05-14 | End: 2019-08-08 | Stop reason: SDUPTHER

## 2019-05-20 DIAGNOSIS — G25.81 RESTLESS LEGS SYNDROME: ICD-10-CM

## 2019-05-20 RX ORDER — GABAPENTIN 100 MG/1
CAPSULE ORAL
Qty: 120 CAPSULE | Refills: 0 | Status: SHIPPED | OUTPATIENT
Start: 2019-05-20 | End: 2019-06-17 | Stop reason: SDUPTHER

## 2019-06-17 DIAGNOSIS — G25.81 RESTLESS LEGS SYNDROME: ICD-10-CM

## 2019-06-17 RX ORDER — GABAPENTIN 100 MG/1
CAPSULE ORAL
Qty: 120 CAPSULE | Refills: 0 | Status: SHIPPED | OUTPATIENT
Start: 2019-06-17 | End: 2019-07-18 | Stop reason: SDUPTHER

## 2019-07-10 ENCOUNTER — OFFICE VISIT (OUTPATIENT)
Dept: PULMONOLOGY | Age: 84
End: 2019-07-10
Payer: MEDICARE

## 2019-07-10 VITALS
OXYGEN SATURATION: 97 % | BODY MASS INDEX: 28.35 KG/M2 | HEIGHT: 63 IN | WEIGHT: 160 LBS | RESPIRATION RATE: 18 BRPM | SYSTOLIC BLOOD PRESSURE: 122 MMHG | DIASTOLIC BLOOD PRESSURE: 58 MMHG | HEART RATE: 93 BPM

## 2019-07-10 DIAGNOSIS — K21.9 GASTROESOPHAGEAL REFLUX DISEASE WITHOUT ESOPHAGITIS: ICD-10-CM

## 2019-07-10 DIAGNOSIS — J96.11 CHRONIC HYPOXEMIC RESPIRATORY FAILURE (HCC): ICD-10-CM

## 2019-07-10 DIAGNOSIS — R05.3 CHRONIC COUGH: ICD-10-CM

## 2019-07-10 DIAGNOSIS — J84.112 UIP (USUAL INTERSTITIAL PNEUMONITIS) (HCC): Primary | ICD-10-CM

## 2019-07-10 PROCEDURE — 1123F ACP DISCUSS/DSCN MKR DOCD: CPT | Performed by: INTERNAL MEDICINE

## 2019-07-10 PROCEDURE — 1090F PRES/ABSN URINE INCON ASSESS: CPT | Performed by: INTERNAL MEDICINE

## 2019-07-10 PROCEDURE — G8417 CALC BMI ABV UP PARAM F/U: HCPCS | Performed by: INTERNAL MEDICINE

## 2019-07-10 PROCEDURE — 99214 OFFICE O/P EST MOD 30 MIN: CPT | Performed by: INTERNAL MEDICINE

## 2019-07-10 PROCEDURE — G8427 DOCREV CUR MEDS BY ELIG CLIN: HCPCS | Performed by: INTERNAL MEDICINE

## 2019-07-10 PROCEDURE — 1036F TOBACCO NON-USER: CPT | Performed by: INTERNAL MEDICINE

## 2019-07-10 PROCEDURE — G8399 PT W/DXA RESULTS DOCUMENT: HCPCS | Performed by: INTERNAL MEDICINE

## 2019-07-10 PROCEDURE — 4040F PNEUMOC VAC/ADMIN/RCVD: CPT | Performed by: INTERNAL MEDICINE

## 2019-07-10 NOTE — PROGRESS NOTES
[facility]  Progress Note  MD Lang Montgomery   YOB: 1935    Date of Visit:  7/10/2019    Assessment/Plan:  1. UIP (usual interstitial pneumonitis) ([facility] [address]  · I have independently reviewed radiographic images for this patient as part of this visit. · I have independently reviewed pulmonary function testing. · CT imaging 4/19:    Impression   Moderate to severe chronic interstitial lung disease which has significantly   progressed since November 2018.  No acute superimposed abnormality   identified.  Mild bronchiectasis.  Mild cardiomegaly.  Small pericardial   effusion.  Mild reactive mediastinal adenopathy. · OFEV made her sick in 2015  · Tolerating Esbriet 3 tablets TID. Does have some occasional nausea    2. Chronic respiratory failure with hypoxia (HCC)  · O2 saturation falls to 81% walking <200 feet on flat ground  · She will benefit from O2 with sleep and exertion    3. Gastroesophageal reflux disease without esophagitis  · Continue Prilosec  · Concern that ongoing GERD could contribute to worsening lung disease  · Symptomatically, she is pretty well controlled    4. Cough  · This is her major symptom  · This is a stable problem without significant change in the last 12 months      FOLLOW UP: 4 months. Chief Complaint   Patient presents with    Shortness of Breath     3 mo follow up - pt states that her breathing is good as long as she stays sitting       HPI  The patient presentwith a chief complaint of shortness of breath, cough due to UIP. She was hospitalized in April with respiratory failure. She had trouble with O2 sats. Now better. There is no complaint of chest pain, nausea or vomiting. Review of Systems  As documented in HPI     Physical Exam:  Well developed, well nourished  Alert and oriented  Sclera is clear  No cervical adenopathy  No JVD.   Chest examination is basilar inspiratory

## 2019-07-16 ENCOUNTER — CARE COORDINATION (OUTPATIENT)
Dept: CASE MANAGEMENT | Age: 84
End: 2019-07-16

## 2019-07-17 ENCOUNTER — TELEPHONE (OUTPATIENT)
Dept: INTERNAL MEDICINE CLINIC | Age: 84
End: 2019-07-17

## 2019-07-17 RX ORDER — FUROSEMIDE 20 MG/1
20 TABLET ORAL
COMMUNITY
Start: 2019-07-16 | End: 2019-07-18

## 2019-07-17 NOTE — TELEPHONE ENCOUNTER
[facility] started seeing pt today. Pt was d/c home yesterday. Her weight on d/c was 180 . Weight 185 today per pt. She has 1+ BLE edema. Also, gabapentin was not on d/c med list, should pt resume the medication dose prior to admission ?

## 2019-07-18 ENCOUNTER — OFFICE VISIT (OUTPATIENT)
Dept: INTERNAL MEDICINE CLINIC | Age: 84
End: 2019-07-18
Payer: MEDICARE

## 2019-07-18 VITALS — DIASTOLIC BLOOD PRESSURE: 62 MMHG | HEART RATE: 96 BPM | SYSTOLIC BLOOD PRESSURE: 100 MMHG

## 2019-07-18 DIAGNOSIS — J84.10 PULMONARY FIBROSIS (HCC): ICD-10-CM

## 2019-07-18 DIAGNOSIS — R29.6 FREQUENT FALLS: ICD-10-CM

## 2019-07-18 DIAGNOSIS — R29.898 LEG WEAKNESS, BILATERAL: ICD-10-CM

## 2019-07-18 DIAGNOSIS — Z09 HOSPITAL DISCHARGE FOLLOW-UP: Primary | ICD-10-CM

## 2019-07-18 DIAGNOSIS — R06.02 SHORTNESS OF BREATH: ICD-10-CM

## 2019-07-18 DIAGNOSIS — J96.11 CHRONIC RESPIRATORY FAILURE WITH HYPOXIA (HCC): ICD-10-CM

## 2019-07-18 DIAGNOSIS — Z51.81 MEDICATION MONITORING ENCOUNTER: ICD-10-CM

## 2019-07-18 PROCEDURE — 99495 TRANSJ CARE MGMT MOD F2F 14D: CPT | Performed by: FAMILY MEDICINE

## 2019-07-18 PROCEDURE — 1111F DSCHRG MED/CURRENT MED MERGE: CPT | Performed by: FAMILY MEDICINE

## 2019-07-18 RX ORDER — FUROSEMIDE 20 MG/1
TABLET ORAL
Qty: 60 TABLET | Refills: 0 | Status: SHIPPED | OUTPATIENT
Start: 2019-07-18 | End: 2019-08-06 | Stop reason: DRUGHIGH

## 2019-07-21 ASSESSMENT — ENCOUNTER SYMPTOMS
NAUSEA: 0
COUGH: 0
CHEST TIGHTNESS: 0
DIARRHEA: 0
WHEEZING: 0
VOMITING: 0
SHORTNESS OF BREATH: 1

## 2019-07-29 ENCOUNTER — TELEPHONE (OUTPATIENT)
Dept: INTERNAL MEDICINE CLINIC | Age: 84
End: 2019-07-29

## 2019-07-29 NOTE — TELEPHONE ENCOUNTER
miladis from [facility] calling to report that pt's weight went from 181 to 186 when she checked on pt this morning   Phone   [phone]

## 2019-08-05 ENCOUNTER — TELEPHONE (OUTPATIENT)
Dept: INTERNAL MEDICINE CLINIC | Age: 84
End: 2019-08-05

## 2019-08-05 NOTE — TELEPHONE ENCOUNTER
Home health nurse reporting pt has a h/o CHF. Her weight yesterday was 179 and today is 182. She only has dyspnea w/exertion. Lungs have fine crackles BLL. Her b/p was 102/52, O2 99% on oxygen. Her Lasix was increased to 2 of the 20 mg on Monday, Weds and Friday only.

## 2019-08-06 RX ORDER — FUROSEMIDE 20 MG/1
TABLET ORAL
Qty: 60 TABLET | Refills: 0 | Status: SHIPPED | OUTPATIENT
Start: 2019-08-06 | End: 2019-09-03

## 2019-08-07 ENCOUNTER — OFFICE VISIT (OUTPATIENT)
Dept: PULMONOLOGY | Age: 84
End: 2019-08-07
Payer: MEDICARE

## 2019-08-07 VITALS
BODY MASS INDEX: 31.71 KG/M2 | RESPIRATION RATE: 16 BRPM | HEART RATE: 88 BPM | SYSTOLIC BLOOD PRESSURE: 128 MMHG | DIASTOLIC BLOOD PRESSURE: 64 MMHG | WEIGHT: 179 LBS | OXYGEN SATURATION: 98 %

## 2019-08-07 DIAGNOSIS — J96.11 CHRONIC HYPOXEMIC RESPIRATORY FAILURE (HCC): Primary | ICD-10-CM

## 2019-08-07 DIAGNOSIS — I50.33 ACUTE ON CHRONIC DIASTOLIC HEART FAILURE (HCC): ICD-10-CM

## 2019-08-07 DIAGNOSIS — J84.112 UIP (USUAL INTERSTITIAL PNEUMONITIS) (HCC): ICD-10-CM

## 2019-08-07 PROCEDURE — 1036F TOBACCO NON-USER: CPT | Performed by: NURSE PRACTITIONER

## 2019-08-07 PROCEDURE — 1123F ACP DISCUSS/DSCN MKR DOCD: CPT | Performed by: NURSE PRACTITIONER

## 2019-08-07 PROCEDURE — 4040F PNEUMOC VAC/ADMIN/RCVD: CPT | Performed by: NURSE PRACTITIONER

## 2019-08-07 PROCEDURE — G8427 DOCREV CUR MEDS BY ELIG CLIN: HCPCS | Performed by: NURSE PRACTITIONER

## 2019-08-07 PROCEDURE — G8417 CALC BMI ABV UP PARAM F/U: HCPCS | Performed by: NURSE PRACTITIONER

## 2019-08-07 PROCEDURE — G8399 PT W/DXA RESULTS DOCUMENT: HCPCS | Performed by: NURSE PRACTITIONER

## 2019-08-07 PROCEDURE — 99214 OFFICE O/P EST MOD 30 MIN: CPT | Performed by: NURSE PRACTITIONER

## 2019-08-07 PROCEDURE — 1090F PRES/ABSN URINE INCON ASSESS: CPT | Performed by: NURSE PRACTITIONER

## 2019-08-07 ASSESSMENT — ENCOUNTER SYMPTOMS
COUGH: 0
COLOR CHANGE: 0
CONSTIPATION: 0
SHORTNESS OF BREATH: 1
ABDOMINAL PAIN: 0

## 2019-08-07 NOTE — PROGRESS NOTES
[facility]    Subjective:   CHIEF COMPLAINT / HPI: UIP   The patient is 80 y.o. female who presents today for a routine follow up visit related to the above mentioned issues. There is a PMH significant for colon cancer, RBBB, UIP pulmonary fibrosis on Esbriet with subsequent oxygen dependence. She had recent hospitalization and received systemic steroids for SOB. At the time of her visit with me 3 months ago, symptoms of SOB were back to baseline. Unfortunately she has worsened over the last week. She notes she has gained at least 15 pounds. She talked with her PCP and increased her Lasix to 40 mg daily this morning. There is no cough, fever, chills, gi complaints. She continues to tolerate Esbriet and is now at 2 pills TID. She is not taking Prednisone. She requires 3L NC O2 continuously. Past Medical History:   Diagnosis Date    Acute biliary pancreatitis without infection or necrosis 4/11/2018    Bundle branch block, right     [facility] ([facility] [address] 04/2018    [facility]    Depression     DJD (degenerative joint disease)     primarily knees    Gallstones     GERD (gastroesophageal reflux disease)     Macular degeneration     significant    MDRO (multiple drug resistant organisms) resistance 04/18/2019    urine    Urinary incontinence, mixed      Social History:    Social History     Tobacco Use   Smoking Status Never Smoker   Smokeless Tobacco Never Used     Current Medications:     Current Outpatient Medications on File Prior to Visit   Medication Sig Dispense Refill    furosemide (LASIX) 20 MG tablet Take 2 tabs = 40 mg daily 60 tablet 0    gabapentin (NEURONTIN) 100 MG capsule TAKE TWO CAPSULES BY MOUTH TWICE A DAY, TAKE AT SUPPER AND BEDTIME FOR RESTLESS LEGS 120 capsule 5    Misc.  Devices (ROLLATOR) MISC Use for stability when moving around 1 each 0    ESBRIET 267 MG TABS TAKE 1 TABLET BY MOUTH THREE TIMES A DAY FOR 7 DAYS, THEN 2 TABLETS THREE TIMES A DAY FOR 7 DAYS, THEN 3 TABLETS THREE TIMES A DAY THEREAFTE 207 tablet 3    melatonin 3 MG TABS tablet Take 10 mg by mouth nightly as needed      rOPINIRole (REQUIP) 1 MG tablet TAKE TWO TABLETS BY MOUTH EVERY NIGHT AT BEDTIME 60 tablet 5    omeprazole (PRILOSEC) 20 MG delayed release capsule TAKE ONE CAPSULE BY MOUTH DAILY 30 capsule 5    Misc. Devices [facility]) MISC Use when short of breath and needs to move around without ambulation. 1 each 0    oxybutynin (DITROPAN-XL) 10 MG extended release tablet TAKE ONE TABLET BY MOUTH DAILY 30 tablet 11    multivitamin (THERAGRAN) per tablet Take 1 tablet by mouth daily.  Diphenhydramine-APAP, sleep, (TYLENOL PM EXTRA STRENGTH PO) Take  by mouth nightly. No current facility-administered medications on file prior to visit. Review of Systems   Constitutional: Negative for chills and fever. HENT: Negative for congestion and postnasal drip. Respiratory: Positive for shortness of breath. Negative for cough. Cardiovascular: Positive for leg swelling. Negative for chest pain. Gastrointestinal: Negative for abdominal pain and constipation. Musculoskeletal: Negative for arthralgias and joint swelling. Skin: Negative for color change and pallor. Allergic/Immunologic: Negative for environmental allergies and food allergies. Psychiatric/Behavioral: Negative for agitation and confusion. Objective:       VITALS:  /64   Pulse 88   Resp 16   Wt 179 lb (81.2 kg) Comment: per pt , unable to weigh  SpO2 98% Comment: on 4 lpm  BMI 31.71 kg/m²      Physical Exam   Constitutional: She is oriented to person, place, and time. She appears well-developed and well-nourished. No distress. HENT:   Head: Normocephalic. Mouth/Throat: No oropharyngeal exudate. Eyes: Pupils are equal, round, and reactive to light. Conjunctivae are normal. Right eye exhibits no discharge. Left eye exhibits no discharge. Neck: Normal range of motion.  Neck

## 2019-08-09 RX ORDER — PIRFENIDONE 267 MG/1
TABLET, COATED ORAL
Qty: 270 TABLET | Refills: 2 | Status: SHIPPED | OUTPATIENT
Start: 2019-08-09 | End: 2019-08-30 | Stop reason: SDUPTHER

## 2019-08-14 ENCOUNTER — TELEPHONE (OUTPATIENT)
Dept: INTERNAL MEDICINE CLINIC | Age: 84
End: 2019-08-14

## 2019-08-15 NOTE — TELEPHONE ENCOUNTER
Spoke with Jayce. She says Medicare is asking why patient needs a wheelchair and a rollator. Jayce says hat the only way they would approve both is if the wheelchair did not fit down hallway in patient's home.

## 2019-08-19 ENCOUNTER — TELEPHONE (OUTPATIENT)
Dept: INTERNAL MEDICINE CLINIC | Age: 84
End: 2019-08-19

## 2019-08-30 RX ORDER — PIRFENIDONE 267 MG/1
TABLET, COATED ORAL
Qty: 270 TABLET | Refills: 2 | Status: SHIPPED | OUTPATIENT
Start: 2019-08-30

## 2019-09-03 ENCOUNTER — TELEPHONE (OUTPATIENT)
Dept: INTERNAL MEDICINE CLINIC | Age: 84
End: 2019-09-03

## 2019-09-03 RX ORDER — FUROSEMIDE 20 MG/1
TABLET ORAL
Qty: 60 TABLET | Refills: 0 | OUTPATIENT
Start: 2019-09-03

## 2019-09-03 RX ORDER — FUROSEMIDE 20 MG/1
TABLET ORAL
Qty: 60 TABLET | Refills: 5 | Status: SHIPPED | OUTPATIENT
Start: 2019-09-03 | End: 2019-10-18

## 2019-09-25 ENCOUNTER — OFFICE VISIT (OUTPATIENT)
Dept: INTERNAL MEDICINE CLINIC | Age: 84
End: 2019-09-25
Payer: MEDICARE

## 2019-09-25 VITALS
HEART RATE: 92 BPM | SYSTOLIC BLOOD PRESSURE: 126 MMHG | BODY MASS INDEX: 31.71 KG/M2 | DIASTOLIC BLOOD PRESSURE: 60 MMHG | HEIGHT: 63 IN

## 2019-09-25 DIAGNOSIS — K21.9 GASTROESOPHAGEAL REFLUX DISEASE WITHOUT ESOPHAGITIS: ICD-10-CM

## 2019-09-25 DIAGNOSIS — G25.81 RESTLESS LEGS SYNDROME: ICD-10-CM

## 2019-09-25 DIAGNOSIS — J84.10 PULMONARY FIBROSIS (HCC): Primary | ICD-10-CM

## 2019-09-25 DIAGNOSIS — J96.21 ACUTE ON CHRONIC RESPIRATORY FAILURE WITH HYPOXIA (HCC): ICD-10-CM

## 2019-09-25 DIAGNOSIS — Z51.81 MEDICATION MONITORING ENCOUNTER: ICD-10-CM

## 2019-09-25 DIAGNOSIS — D53.9 MACROCYTIC ANEMIA: ICD-10-CM

## 2019-09-25 DIAGNOSIS — R53.81 PHYSICAL DEBILITY: ICD-10-CM

## 2019-09-25 LAB
A/G RATIO: 1.3 (ref 1.1–2.2)
ALBUMIN SERPL-MCNC: 3.9 G/DL (ref 3.4–5)
ALP BLD-CCNC: 103 U/L (ref 40–129)
ALT SERPL-CCNC: 25 U/L (ref 10–40)
ANION GAP SERPL CALCULATED.3IONS-SCNC: 17 MMOL/L (ref 3–16)
AST SERPL-CCNC: 33 U/L (ref 15–37)
BASOPHILS ABSOLUTE: 0 K/UL (ref 0–0.2)
BASOPHILS RELATIVE PERCENT: 0.4 %
BILIRUB SERPL-MCNC: 0.3 MG/DL (ref 0–1)
BUN BLDV-MCNC: 26 MG/DL (ref 7–20)
CALCIUM SERPL-MCNC: 9 MG/DL (ref 8.3–10.6)
CHLORIDE BLD-SCNC: 97 MMOL/L (ref 99–110)
CO2: 30 MMOL/L (ref 21–32)
CREAT SERPL-MCNC: 1 MG/DL (ref 0.6–1.2)
EOSINOPHILS ABSOLUTE: 0.1 K/UL (ref 0–0.6)
EOSINOPHILS RELATIVE PERCENT: 1.2 %
FERRITIN: 239.8 NG/ML (ref 15–150)
FOLATE: >20 NG/ML (ref 4.78–24.2)
GFR AFRICAN AMERICAN: >60
GFR NON-AFRICAN AMERICAN: 53
GLOBULIN: 3.1 G/DL
GLUCOSE BLD-MCNC: 122 MG/DL (ref 70–99)
HCT VFR BLD CALC: 36 % (ref 36–48)
HEMOGLOBIN: 11.7 G/DL (ref 12–16)
IRON SATURATION: 38 % (ref 15–50)
IRON: 104 UG/DL (ref 37–145)
LYMPHOCYTES ABSOLUTE: 1.3 K/UL (ref 1–5.1)
LYMPHOCYTES RELATIVE PERCENT: 24.9 %
MAGNESIUM: 2.3 MG/DL (ref 1.8–2.4)
MCH RBC QN AUTO: 33.8 PG (ref 26–34)
MCHC RBC AUTO-ENTMCNC: 32.5 G/DL (ref 31–36)
MCV RBC AUTO: 104 FL (ref 80–100)
MONOCYTES ABSOLUTE: 0.3 K/UL (ref 0–1.3)
MONOCYTES RELATIVE PERCENT: 5.8 %
NEUTROPHILS ABSOLUTE: 3.6 K/UL (ref 1.7–7.7)
NEUTROPHILS RELATIVE PERCENT: 67.7 %
PDW BLD-RTO: 15.2 % (ref 12.4–15.4)
PLATELET # BLD: 134 K/UL (ref 135–450)
PMV BLD AUTO: 10.2 FL (ref 5–10.5)
POTASSIUM SERPL-SCNC: 3.8 MMOL/L (ref 3.5–5.1)
RBC # BLD: 3.46 M/UL (ref 4–5.2)
SODIUM BLD-SCNC: 144 MMOL/L (ref 136–145)
TOTAL IRON BINDING CAPACITY: 277 UG/DL (ref 260–445)
TOTAL PROTEIN: 7 G/DL (ref 6.4–8.2)
VITAMIN B-12: 664 PG/ML (ref 211–911)
WBC # BLD: 5.4 K/UL (ref 4–11)

## 2019-09-25 PROCEDURE — 1123F ACP DISCUSS/DSCN MKR DOCD: CPT | Performed by: FAMILY MEDICINE

## 2019-09-25 PROCEDURE — G8427 DOCREV CUR MEDS BY ELIG CLIN: HCPCS | Performed by: FAMILY MEDICINE

## 2019-09-25 PROCEDURE — 99214 OFFICE O/P EST MOD 30 MIN: CPT | Performed by: FAMILY MEDICINE

## 2019-09-25 PROCEDURE — 90653 IIV ADJUVANT VACCINE IM: CPT | Performed by: FAMILY MEDICINE

## 2019-09-25 PROCEDURE — G8399 PT W/DXA RESULTS DOCUMENT: HCPCS | Performed by: FAMILY MEDICINE

## 2019-09-25 PROCEDURE — G0008 ADMIN INFLUENZA VIRUS VAC: HCPCS | Performed by: FAMILY MEDICINE

## 2019-09-25 PROCEDURE — 1090F PRES/ABSN URINE INCON ASSESS: CPT | Performed by: FAMILY MEDICINE

## 2019-09-25 PROCEDURE — 4040F PNEUMOC VAC/ADMIN/RCVD: CPT | Performed by: FAMILY MEDICINE

## 2019-09-25 PROCEDURE — 1036F TOBACCO NON-USER: CPT | Performed by: FAMILY MEDICINE

## 2019-09-25 PROCEDURE — G8417 CALC BMI ABV UP PARAM F/U: HCPCS | Performed by: FAMILY MEDICINE

## 2019-09-25 RX ORDER — LOSARTAN POTASSIUM 25 MG/1
12.5 TABLET ORAL DAILY
COMMUNITY
Start: 2019-09-09

## 2019-09-29 ASSESSMENT — ENCOUNTER SYMPTOMS
ABDOMINAL DISTENTION: 0
ABDOMINAL PAIN: 0
DIARRHEA: 0
CONSTIPATION: 0
COUGH: 0
SHORTNESS OF BREATH: 1

## 2019-09-30 ENCOUNTER — TELEPHONE (OUTPATIENT)
Dept: INTERNAL MEDICINE CLINIC | Age: 84
End: 2019-09-30

## 2019-09-30 DIAGNOSIS — J96.21 ACUTE ON CHRONIC RESPIRATORY FAILURE WITH HYPOXIA (HCC): ICD-10-CM

## 2019-09-30 DIAGNOSIS — J84.10 PULMONARY FIBROSIS (HCC): Primary | ICD-10-CM

## 2019-09-30 DIAGNOSIS — R53.81 PHYSICAL DEBILITY: ICD-10-CM

## 2019-10-01 DIAGNOSIS — N39.46 URINARY INCONTINENCE, MIXED: ICD-10-CM

## 2019-10-01 RX ORDER — OXYBUTYNIN CHLORIDE 10 MG/1
TABLET, EXTENDED RELEASE ORAL
Qty: 30 TABLET | Refills: 10 | Status: SHIPPED | OUTPATIENT
Start: 2019-10-01

## 2019-10-02 DIAGNOSIS — K21.9 GASTROESOPHAGEAL REFLUX DISEASE WITHOUT ESOPHAGITIS: ICD-10-CM

## 2019-10-02 RX ORDER — OMEPRAZOLE 20 MG/1
CAPSULE, DELAYED RELEASE ORAL
Qty: 30 CAPSULE | Refills: 4 | Status: SHIPPED | OUTPATIENT
Start: 2019-10-02

## 2019-10-18 ENCOUNTER — TELEPHONE (OUTPATIENT)
Dept: RHEUMATOLOGY | Age: 84
End: 2019-10-18

## 2019-10-18 RX ORDER — FUROSEMIDE 20 MG/1
TABLET ORAL
Qty: 60 TABLET | Refills: 5
Start: 2019-10-18

## 2019-10-21 ENCOUNTER — TELEPHONE (OUTPATIENT)
Dept: INTERNAL MEDICINE CLINIC | Age: 84
End: 2019-10-21

## 2019-10-31 ENCOUNTER — TELEPHONE (OUTPATIENT)
Dept: INTERNAL MEDICINE CLINIC | Age: 84
End: 2019-10-31

## 2019-10-31 DIAGNOSIS — G25.81 RESTLESS LEGS SYNDROME: ICD-10-CM

## 2019-10-31 RX ORDER — ROPINIROLE 1 MG/1
TABLET, FILM COATED ORAL
Qty: 60 TABLET | Refills: 4 | Status: SHIPPED | OUTPATIENT
Start: 2019-10-31

## 2019-11-13 ENCOUNTER — TELEPHONE (OUTPATIENT)
Dept: INTERNAL MEDICINE CLINIC | Age: 84
End: 2019-11-13

## 2019-11-13 RX ORDER — ONDANSETRON 4 MG/1
4 TABLET, FILM COATED ORAL EVERY 8 HOURS PRN
Qty: 30 TABLET | Refills: 1 | Status: SHIPPED | OUTPATIENT
Start: 2019-11-13

## 2019-12-04 ENCOUNTER — TELEPHONE (OUTPATIENT)
Dept: PULMONOLOGY | Age: 84
End: 2019-12-04

## 2019-12-26 ENCOUNTER — CARE COORDINATION (OUTPATIENT)
Dept: CASE MANAGEMENT | Age: 84
End: 2019-12-26

## 2019-12-30 ENCOUNTER — CARE COORDINATION (OUTPATIENT)
Dept: CASE MANAGEMENT | Age: 84
End: 2019-12-30

## 2020-01-09 ENCOUNTER — CARE COORDINATION (OUTPATIENT)
Dept: CARE COORDINATION | Age: 85
End: 2020-01-09

## 2020-01-09 ENCOUNTER — OFFICE VISIT (OUTPATIENT)
Dept: PULMONOLOGY | Age: 85
End: 2020-01-09
Payer: MEDICARE

## 2020-01-09 VITALS
DIASTOLIC BLOOD PRESSURE: 66 MMHG | HEIGHT: 62 IN | WEIGHT: 180 LBS | BODY MASS INDEX: 33.13 KG/M2 | SYSTOLIC BLOOD PRESSURE: 99 MMHG | OXYGEN SATURATION: 95 % | RESPIRATION RATE: 18 BRPM | HEART RATE: 91 BPM

## 2020-01-09 PROCEDURE — 1090F PRES/ABSN URINE INCON ASSESS: CPT | Performed by: NURSE PRACTITIONER

## 2020-01-09 PROCEDURE — 99214 OFFICE O/P EST MOD 30 MIN: CPT | Performed by: NURSE PRACTITIONER

## 2020-01-09 PROCEDURE — 4040F PNEUMOC VAC/ADMIN/RCVD: CPT | Performed by: NURSE PRACTITIONER

## 2020-01-09 PROCEDURE — 1036F TOBACCO NON-USER: CPT | Performed by: NURSE PRACTITIONER

## 2020-01-09 PROCEDURE — G8399 PT W/DXA RESULTS DOCUMENT: HCPCS | Performed by: NURSE PRACTITIONER

## 2020-01-09 PROCEDURE — G8417 CALC BMI ABV UP PARAM F/U: HCPCS | Performed by: NURSE PRACTITIONER

## 2020-01-09 PROCEDURE — G8428 CUR MEDS NOT DOCUMENT: HCPCS | Performed by: NURSE PRACTITIONER

## 2020-01-09 PROCEDURE — G8482 FLU IMMUNIZE ORDER/ADMIN: HCPCS | Performed by: NURSE PRACTITIONER

## 2020-01-09 PROCEDURE — 1123F ACP DISCUSS/DSCN MKR DOCD: CPT | Performed by: NURSE PRACTITIONER

## 2020-01-09 ASSESSMENT — ENCOUNTER SYMPTOMS
COLOR CHANGE: 0
SHORTNESS OF BREATH: 1
COUGH: 0
ABDOMINAL PAIN: 0
CONSTIPATION: 0

## 2020-01-09 NOTE — PROGRESS NOTES
[facility] Up [facility]    Subjective:   CHIEF COMPLAINT / HPI: UIP   The patient is 80 y.o. female who presents today for a routine follow up visit related to the above mentioned issues. There is a PMH significant for colon cancer, RBBB, UIP pulmonary fibrosis on Esbriet with subsequent oxygen dependence. I saw her last in August, and at that time she reported no cough, fever, chills or gi complaints. Presently she reports SOB which has been worsening over time. There is no cough. She does notice worsened LE edema and weight gain. She stopped her Esbriet about 2 months ago d/t nausea. She is not taking Prednisone. She requires 3L NC O2 continuously.         Past Medical History:   Diagnosis Date    Acute biliary pancreatitis without infection or necrosis 4/11/2018    Bundle branch block, right     Colon cancer ([address] 04/[address]    Depression     DJD (degenerative joint disease)     primarily knees    Gallstones     GERD (gastroesophageal reflux disease)     Macular degeneration     significant    MDRO (multiple drug resistant organisms) resistance 04/18/2019    urine    Urinary incontinence, mixed      Social History:    Social History     Tobacco Use   Smoking Status Never Smoker   Smokeless Tobacco Never Used     Current Medications:     Current Outpatient Medications on File Prior to Visit   Medication Sig Dispense Refill    ondansetron (ZOFRAN) 4 MG tablet Take 1 tablet by mouth every 8 hours as needed for Nausea or Vomiting 30 tablet 1    rOPINIRole (REQUIP) 1 MG tablet TAKE TWO TABLETS BY MOUTH EVERY NIGHT AT BEDTIME 60 tablet 4    furosemide (LASIX) 20 MG tablet Take 2 tabs = 40 mg in AM and 20 mg in PM 60 tablet 5    omeprazole (PRILOSEC) 20 MG delayed release capsule TAKE ONE CAPSULE BY MOUTH DAILY 30 capsule 4    oxybutynin (DITROPAN-XL) 10 MG extended release tablet TAKE ONE TABLET BY MOUTH DAILY 30 tablet 10    losartan (COZAAR) 25 MG tablet Take 12.5 mg by mouth daily      ESBRIET 267 MG TABS TAKE 3 TABLETS BY MOUTH THREE TIMES DAILY WITH MEALS. PROTECT FROM SUNBURN. CALL [phone] TO REFILL. 270 tablet 2    gabapentin (NEURONTIN) 100 MG capsule TAKE TWO CAPSULES BY MOUTH TWICE A DAY, TAKE AT SUPPER AND BEDTIME FOR RESTLESS LEGS 120 capsule 5    melatonin 3 MG TABS tablet Take 10 mg by mouth nightly as needed      multivitamin (THERAGRAN) per tablet Take 1 tablet by mouth daily.  Diphenhydramine-APAP, sleep, (TYLENOL PM EXTRA STRENGTH PO) Take  by mouth nightly. No current facility-administered medications on file prior to visit. Review of Systems   Constitutional: Negative for chills and fever. HENT: Negative for congestion and postnasal drip. Respiratory: Positive for shortness of breath. Negative for cough. Cardiovascular: Positive for leg swelling. Negative for chest pain. Gastrointestinal: Negative for abdominal pain and constipation. Musculoskeletal: Negative for arthralgias and joint swelling. Skin: Negative for color change and pallor. Allergic/Immunologic: Negative for environmental allergies and food allergies. Psychiatric/Behavioral: Negative for agitation and confusion. Objective:       VITALS:  BP 99/66   Pulse 91   Resp 18   Ht 5' 2\" (1.575 m)   Wt 180 lb (81.6 kg)   SpO2 95% Comment: sitting on 3 L  BMI 32.92 kg/m²      Physical Exam  Vitals signs reviewed. Constitutional:       General: She is not in acute distress. Appearance: She is well-developed. HENT:      Head: Normocephalic. Mouth/Throat:      Pharynx: No oropharyngeal exudate. Eyes:      General:         Right eye: No discharge. Left eye: No discharge. Conjunctiva/sclera: Conjunctivae normal.      Pupils: Pupils are equal, round, and reactive to light. Neck:      Musculoskeletal: Normal range of motion and neck supple. Trachea: No tracheal deviation.    Cardiovascular:      Rate and Rhythm: Normal rate and regular rhythm. Heart sounds: No friction rub. Pulmonary:      Effort: Pulmonary effort is normal. No tachypnea, accessory muscle usage or respiratory distress. Breath sounds: No stridor. No wheezing or rales. Chest:      Chest wall: No tenderness or crepitus. Abdominal:      General: Bowel sounds are normal. There is no distension. Palpations: Abdomen is soft. Tenderness: There is no tenderness. Musculoskeletal: Normal range of motion. Right lower leg: Edema present. Left lower leg: Edema present. Lymphadenopathy:      Cervical: No cervical adenopathy. Skin:     General: Skin is warm and dry. Findings: No erythema. Neurological:      Mental Status: She is alert and oriented to person, place, and time. Psychiatric:         Thought Content: Thought content normal.       DATA:      Radiology Review:  Pertinent images / reports were reviewed as a part of this visit. CXR done 19:  Prominent reticular opacities in bilateral lungs. Heart is not enlarged. No large pleural effusion or pneumothorax. [IMPRESSION]   Prominent reticular opacities in bilateral lung suggestive of chronic interstitial lung disease. No significant interval change since     CT Chest done 19 reveals the following: Moderate to severe chronic interstitial lung disease which has significantly progressed since 2018. No acute superimposed abnormality identified. Mild bronchiectasis. Mild cardiomegaly. Small pericardial effusion. Mild reactive mediastinal adenopathy. Last PFTs done Oct 2017:  Spirometry attempts were acceptable and reproducible. FVC was normal at 1.82 liters, 81% predicted and normal FEV1 of 1.61  liters, 97% predicted. FEV1/FVC ratio was normal.  Lung volumes  showed  total lung capacity of 73% predicted. Diffusion  capacity showed decreased DLCO of 39% predicted.      IMPRESSION:  Mild restrictive pattern seen on this pulmonary function  test with severe decrease in diffusion capacity. In comparison to the  test that was done in 07/2016, FVC has decreased by 9% in total lung    Assessment / Plan:   1. Chronic hypoxemic respiratory failure (HCC)  - Decreased saturations when walking per report  - OK to turn up oxygen with ambulation to maintain saturations between 88-90%  - She currently has a pulse delivery POC, may not tolerate this long term    2. UIP (usual interstitial pneumonitis) (HCC)  - Last image showed worsening of this and is consistent with worsening symptoms  - Did not tolerate 3 pills 3 times daily of the Esbriet  - We discussed options including transition to Ofev with similar side effect profile, no drug or lower dose Esbriet   - Back down to BID dosing and uptitrate as tolerated     3. Acute on chronic diastolic heart failure (HCC)  - Edema and weight again up  - Recommend TID Lasix x 3 days  - She plans to f/u with Cardiology next week and would prefer to wait until she has seen  - Educated to avoid salt and monitor fluid intake, reporting weight gain of more than 3 pounds in 24 hours    Return in about 3 months (around 4/9/2020). RTC sooner if symptoms worsen acutely.      Taiwo Romero MSN APRN-ACNP CCRN

## 2020-01-21 ENCOUNTER — CARE COORDINATION (OUTPATIENT)
Dept: CASE MANAGEMENT | Age: 85
End: 2020-01-21

## 2020-01-24 ENCOUNTER — CARE COORDINATION (OUTPATIENT)
Dept: CASE MANAGEMENT | Age: 85
End: 2020-01-24

## 2020-01-24 ENCOUNTER — TELEPHONE (OUTPATIENT)
Dept: PULMONOLOGY | Age: 85
End: 2020-01-24